# Patient Record
Sex: FEMALE | Race: WHITE | NOT HISPANIC OR LATINO | Employment: PART TIME | URBAN - METROPOLITAN AREA
[De-identification: names, ages, dates, MRNs, and addresses within clinical notes are randomized per-mention and may not be internally consistent; named-entity substitution may affect disease eponyms.]

---

## 2023-04-18 ENCOUNTER — OFFICE VISIT (OUTPATIENT)
Dept: PRIMARY CARE | Facility: CLINIC | Age: 78
End: 2023-04-18
Payer: MEDICARE

## 2023-04-18 VITALS
SYSTOLIC BLOOD PRESSURE: 110 MMHG | OXYGEN SATURATION: 96 % | TEMPERATURE: 97 F | HEART RATE: 65 BPM | DIASTOLIC BLOOD PRESSURE: 70 MMHG | WEIGHT: 131 LBS | BODY MASS INDEX: 21.14 KG/M2

## 2023-04-18 DIAGNOSIS — R63.4 WEIGHT LOSS: Primary | ICD-10-CM

## 2023-04-18 DIAGNOSIS — E78.49 OTHER HYPERLIPIDEMIA: ICD-10-CM

## 2023-04-18 PROBLEM — F41.9 ANXIETY DISORDER: Status: ACTIVE | Noted: 2023-04-18

## 2023-04-18 PROBLEM — G47.00 INSOMNIA: Status: ACTIVE | Noted: 2023-04-18

## 2023-04-18 PROBLEM — E78.00 HYPERCHOLESTEROLEMIA: Status: ACTIVE | Noted: 2023-04-18

## 2023-04-18 PROCEDURE — 1036F TOBACCO NON-USER: CPT | Performed by: FAMILY MEDICINE

## 2023-04-18 PROCEDURE — 1160F RVW MEDS BY RX/DR IN RCRD: CPT | Performed by: FAMILY MEDICINE

## 2023-04-18 PROCEDURE — 1159F MED LIST DOCD IN RCRD: CPT | Performed by: FAMILY MEDICINE

## 2023-04-18 PROCEDURE — 99212 OFFICE O/P EST SF 10 MIN: CPT | Performed by: FAMILY MEDICINE

## 2023-04-18 RX ORDER — CITALOPRAM 20 MG/1
10 TABLET, FILM COATED ORAL DAILY
Qty: 15 TABLET | Refills: 11 | Status: SHIPPED
Start: 2023-04-18 | End: 2024-02-27

## 2023-04-18 RX ORDER — CITALOPRAM 20 MG/1
1 TABLET, FILM COATED ORAL DAILY
COMMUNITY
Start: 2016-08-08 | End: 2023-04-18 | Stop reason: DRUGHIGH

## 2023-04-18 ASSESSMENT — ENCOUNTER SYMPTOMS
LOSS OF SENSATION IN FEET: 0
DEPRESSION: 0
OCCASIONAL FEELINGS OF UNSTEADINESS: 0

## 2023-04-18 NOTE — PROGRESS NOTES
"Subjective   Patient ID: Tawnya Odell is a 77 y.o. female who presents for New Patient Visit (Discuss loosing weight, has been going on a while. ).    HPI    WEIGHT LOSS: she estimates she has lost 10 pounds over the last year or so  No changes in diet or routine  Denies frequent heartburn or diarrhea    Takes stool softener with magnesium for regularity     Denies polyuria, polydipsia, polyphagia    \"Feel spacey\" during the day     Review of Systems  Review of Systems negative except as noted in HPI and Chief complaint.     Objective               Physical Exam  Constitutional:       General: She is not in acute distress.     Appearance: Normal appearance. She is not ill-appearing.   HENT:      Head: Normocephalic and atraumatic.   Neck:      Vascular: No carotid bruit.   Cardiovascular:      Rate and Rhythm: Normal rate and regular rhythm.      Pulses: Normal pulses.      Heart sounds: Normal heart sounds. No murmur heard.     No gallop.   Pulmonary:      Effort: Pulmonary effort is normal.      Breath sounds: Normal breath sounds. No wheezing, rhonchi or rales.   Musculoskeletal:      Cervical back: Normal range of motion and neck supple. No rigidity or tenderness.   Lymphadenopathy:      Cervical: No cervical adenopathy.   Skin:     General: Skin is warm and dry.   Neurological:      Mental Status: She is alert.   Psychiatric:         Mood and Affect: Mood normal.         Behavior: Behavior normal.       /70 (BP Location: Left arm, Patient Position: Sitting, BP Cuff Size: Adult)   Pulse 65   Temp 36.1 °C (97 °F)   Wt 59.4 kg (131 lb)   SpO2 96%   BMI 21.14 kg/m²      Assessment/Plan   Problem List Items Addressed This Visit          Endocrine/Metabolic    Weight loss - Primary    Relevant Orders    CBC (Completed)    Comprehensive Metabolic Panel (Completed)    TSH with reflex to Free T4 if abnormal (Completed)       Other    Other hyperlipidemia    Relevant Orders    Lipid Panel (Completed)        "

## 2023-04-19 ENCOUNTER — LAB (OUTPATIENT)
Dept: LAB | Facility: LAB | Age: 78
End: 2023-04-19
Payer: MEDICARE

## 2023-04-19 DIAGNOSIS — E78.49 OTHER HYPERLIPIDEMIA: ICD-10-CM

## 2023-04-19 DIAGNOSIS — R63.4 WEIGHT LOSS: ICD-10-CM

## 2023-04-19 LAB
ALANINE AMINOTRANSFERASE (SGPT) (U/L) IN SER/PLAS: 18 U/L (ref 7–45)
ALBUMIN (G/DL) IN SER/PLAS: 4.5 G/DL (ref 3.4–5)
ALKALINE PHOSPHATASE (U/L) IN SER/PLAS: 64 U/L (ref 33–136)
ANION GAP IN SER/PLAS: 11 MMOL/L (ref 10–20)
ASPARTATE AMINOTRANSFERASE (SGOT) (U/L) IN SER/PLAS: 17 U/L (ref 9–39)
BILIRUBIN TOTAL (MG/DL) IN SER/PLAS: 0.7 MG/DL (ref 0–1.2)
CALCIUM (MG/DL) IN SER/PLAS: 9.7 MG/DL (ref 8.6–10.6)
CARBON DIOXIDE, TOTAL (MMOL/L) IN SER/PLAS: 32 MMOL/L (ref 21–32)
CHLORIDE (MMOL/L) IN SER/PLAS: 104 MMOL/L (ref 98–107)
CHOLESTEROL (MG/DL) IN SER/PLAS: 234 MG/DL (ref 0–199)
CHOLESTEROL IN HDL (MG/DL) IN SER/PLAS: 91.5 MG/DL
CHOLESTEROL/HDL RATIO: 2.6
CREATININE (MG/DL) IN SER/PLAS: 0.76 MG/DL (ref 0.5–1.05)
ERYTHROCYTE DISTRIBUTION WIDTH (RATIO) BY AUTOMATED COUNT: 12.1 % (ref 11.5–14.5)
ERYTHROCYTE MEAN CORPUSCULAR HEMOGLOBIN CONCENTRATION (G/DL) BY AUTOMATED: 32.9 G/DL (ref 32–36)
ERYTHROCYTE MEAN CORPUSCULAR VOLUME (FL) BY AUTOMATED COUNT: 98 FL (ref 80–100)
ERYTHROCYTES (10*6/UL) IN BLOOD BY AUTOMATED COUNT: 4.34 X10E12/L (ref 4–5.2)
GFR FEMALE: 81 ML/MIN/1.73M2
GLUCOSE (MG/DL) IN SER/PLAS: 83 MG/DL (ref 74–99)
HEMATOCRIT (%) IN BLOOD BY AUTOMATED COUNT: 42.6 % (ref 36–46)
HEMOGLOBIN (G/DL) IN BLOOD: 14 G/DL (ref 12–16)
LDL: 125 MG/DL (ref 0–99)
LEUKOCYTES (10*3/UL) IN BLOOD BY AUTOMATED COUNT: 6.4 X10E9/L (ref 4.4–11.3)
NRBC (PER 100 WBCS) BY AUTOMATED COUNT: 0 /100 WBC (ref 0–0)
PLATELETS (10*3/UL) IN BLOOD AUTOMATED COUNT: 231 X10E9/L (ref 150–450)
POTASSIUM (MMOL/L) IN SER/PLAS: 4.6 MMOL/L (ref 3.5–5.3)
PROTEIN TOTAL: 6.6 G/DL (ref 6.4–8.2)
SODIUM (MMOL/L) IN SER/PLAS: 142 MMOL/L (ref 136–145)
THYROTROPIN (MIU/L) IN SER/PLAS BY DETECTION LIMIT <= 0.05 MIU/L: 2.61 MIU/L (ref 0.44–3.98)
TRIGLYCERIDE (MG/DL) IN SER/PLAS: 87 MG/DL (ref 0–149)
UREA NITROGEN (MG/DL) IN SER/PLAS: 16 MG/DL (ref 6–23)
VLDL: 17 MG/DL (ref 0–40)

## 2023-04-19 PROCEDURE — 80061 LIPID PANEL: CPT

## 2023-04-19 PROCEDURE — 84443 ASSAY THYROID STIM HORMONE: CPT

## 2023-04-19 PROCEDURE — 36415 COLL VENOUS BLD VENIPUNCTURE: CPT

## 2023-04-19 PROCEDURE — 85027 COMPLETE CBC AUTOMATED: CPT

## 2023-04-19 PROCEDURE — 80053 COMPREHEN METABOLIC PANEL: CPT

## 2023-04-23 PROBLEM — E78.49 OTHER HYPERLIPIDEMIA: Status: ACTIVE | Noted: 2023-04-18

## 2023-04-23 PROBLEM — R63.4 WEIGHT LOSS: Status: ACTIVE | Noted: 2023-04-23

## 2023-04-24 NOTE — PATIENT INSTRUCTIONS
Follow-up  for Medicare AWV after fasting labs obtained.  Please have labs drawn at your earliest convenience.  You should fast 12 hours prior to arriving at the lab - during these 12 hours you may have water, black coffee, black tea - nothing with cream or sugar and no solid foods.    Our office will contact you with results after they have been reviewed.  Please allow 48 - 72 hours for most results, some may take longer.  Please keep in mind that results may post immediately to your online portal, even before we have a chance to review them.  Once we have had the opportunity to review we will post comments and have our staff contact you with results and any instructions.  Please call our office if you do not hear from our office in 7 days.

## 2023-05-03 ENCOUNTER — TELEPHONE (OUTPATIENT)
Dept: PRIMARY CARE | Facility: CLINIC | Age: 78
End: 2023-05-03
Payer: MEDICARE

## 2023-05-03 NOTE — TELEPHONE ENCOUNTER
PATIENT L VOICEMAIL TO MAKE A N APPT, I CALLED AND   L/M FOR PATIENT TO CALL BACK AND MAKE AN APPT.

## 2023-05-15 ENCOUNTER — OFFICE VISIT (OUTPATIENT)
Dept: PRIMARY CARE | Facility: CLINIC | Age: 78
End: 2023-05-15
Payer: MEDICARE

## 2023-05-15 VITALS
OXYGEN SATURATION: 97 % | DIASTOLIC BLOOD PRESSURE: 60 MMHG | SYSTOLIC BLOOD PRESSURE: 102 MMHG | BODY MASS INDEX: 20.89 KG/M2 | HEART RATE: 86 BPM | WEIGHT: 130 LBS | HEIGHT: 66 IN

## 2023-05-15 DIAGNOSIS — E78.00 ELEVATED LDL CHOLESTEROL LEVEL: ICD-10-CM

## 2023-05-15 DIAGNOSIS — L65.9 ALOPECIA: Primary | ICD-10-CM

## 2023-05-15 DIAGNOSIS — R63.4 WEIGHT LOSS: ICD-10-CM

## 2023-05-15 PROCEDURE — 1160F RVW MEDS BY RX/DR IN RCRD: CPT | Performed by: FAMILY MEDICINE

## 2023-05-15 PROCEDURE — 1170F FXNL STATUS ASSESSED: CPT | Performed by: FAMILY MEDICINE

## 2023-05-15 PROCEDURE — 99213 OFFICE O/P EST LOW 20 MIN: CPT | Performed by: FAMILY MEDICINE

## 2023-05-15 PROCEDURE — 1159F MED LIST DOCD IN RCRD: CPT | Performed by: FAMILY MEDICINE

## 2023-05-15 PROCEDURE — 1036F TOBACCO NON-USER: CPT | Performed by: FAMILY MEDICINE

## 2023-05-15 ASSESSMENT — PATIENT HEALTH QUESTIONNAIRE - PHQ9
2. FEELING DOWN, DEPRESSED OR HOPELESS: NOT AT ALL
8. MOVING OR SPEAKING SO SLOWLY THAT OTHER PEOPLE COULD HAVE NOTICED. OR THE OPPOSITE, BEING SO FIGETY OR RESTLESS THAT YOU HAVE BEEN MOVING AROUND A LOT MORE THAN USUAL: NOT AT ALL
SUM OF ALL RESPONSES TO PHQ QUESTIONS 1-9: 3
4. FEELING TIRED OR HAVING LITTLE ENERGY: SEVERAL DAYS
10. IF YOU CHECKED OFF ANY PROBLEMS, HOW DIFFICULT HAVE THESE PROBLEMS MADE IT FOR YOU TO DO YOUR WORK, TAKE CARE OF THINGS AT HOME, OR GET ALONG WITH OTHER PEOPLE: SOMEWHAT DIFFICULT
SUM OF ALL RESPONSES TO PHQ9 QUESTIONS 1 AND 2: 0
1. LITTLE INTEREST OR PLEASURE IN DOING THINGS: NOT AT ALL
5. POOR APPETITE OR OVEREATING: NOT AT ALL
7. TROUBLE CONCENTRATING ON THINGS, SUCH AS READING THE NEWSPAPER OR WATCHING TELEVISION: NOT AT ALL
9. THOUGHTS THAT YOU WOULD BE BETTER OFF DEAD, OR OF HURTING YOURSELF: NOT AT ALL
3. TROUBLE FALLING OR STAYING ASLEEP OR SLEEPING TOO MUCH: SEVERAL DAYS
6. FEELING BAD ABOUT YOURSELF - OR THAT YOU ARE A FAILURE OR HAVE LET YOURSELF OR YOUR FAMILY DOWN: SEVERAL DAYS

## 2023-05-15 ASSESSMENT — ACTIVITIES OF DAILY LIVING (ADL)
HEARING - RIGHT EAR: FUNCTIONAL
DRESSING YOURSELF: INDEPENDENT
TOILETING: INDEPENDENT
FEEDING YOURSELF: INDEPENDENT
PATIENT'S MEMORY ADEQUATE TO SAFELY COMPLETE DAILY ACTIVITIES?: YES
WALKS IN HOME: INDEPENDENT
BATHING: INDEPENDENT
JUDGMENT_ADEQUATE_SAFELY_COMPLETE_DAILY_ACTIVITIES: YES
ADEQUATE_TO_COMPLETE_ADL: YES
HEARING - LEFT EAR: FUNCTIONAL
GROOMING: INDEPENDENT

## 2023-05-15 ASSESSMENT — ENCOUNTER SYMPTOMS
LOSS OF SENSATION IN FEET: 0
DEPRESSION: 0
OCCASIONAL FEELINGS OF UNSTEADINESS: 0

## 2023-05-15 ASSESSMENT — ANXIETY QUESTIONNAIRES
IF YOU CHECKED OFF ANY PROBLEMS ON THIS QUESTIONNAIRE, HOW DIFFICULT HAVE THESE PROBLEMS MADE IT FOR YOU TO DO YOUR WORK, TAKE CARE OF THINGS AT HOME, OR GET ALONG WITH OTHER PEOPLE: SOMEWHAT DIFFICULT
5. BEING SO RESTLESS THAT IT IS HARD TO SIT STILL: NOT AT ALL
2. NOT BEING ABLE TO STOP OR CONTROL WORRYING: SEVERAL DAYS
4. TROUBLE RELAXING: SEVERAL DAYS
7. FEELING AFRAID AS IF SOMETHING AWFUL MIGHT HAPPEN: NOT AT ALL
1. FEELING NERVOUS, ANXIOUS, OR ON EDGE: NOT AT ALL
3. WORRYING TOO MUCH ABOUT DIFFERENT THINGS: SEVERAL DAYS
GAD7 TOTAL SCORE: 3
6. BECOMING EASILY ANNOYED OR IRRITABLE: NOT AT ALL

## 2023-05-15 NOTE — ASSESSMENT & PLAN NOTE
Suggest OTC Hair and Nails supplement with B complex and Folate.  Referral to dermatology for evaluation as well.

## 2023-05-15 NOTE — PATIENT INSTRUCTIONS
Trial of Red Yeast Rice for cholesterol    Hair Loss: trial of Folate and B complex  Referral to [facility] for evaluation    Follow-up after July for Medicare AWV.

## 2023-05-15 NOTE — PROGRESS NOTES
Subjective   Patient ID: Tawnya Odell is a 77 y.o. female who presents for Weight Loss and Alopecia.  HPI    HYPERLIPIDEMIA:  Parents with hyperlipidemia  She does not eat red meat or wilson any longer  She was trie don medication in the pst with her former PCP and did not tolerate the myalgias and fatigue    The 10-year ASCVD risk score (Alli BETH, et al., 2019) is: 10.9%    Values used to calculate the score:      Age: 77 years      Sex: Female      Is Non- : No      Diabetic: No      Tobacco smoker: No      Systolic Blood Pressure: 102 mmHg      Is BP treated: No      HDL Cholesterol: 91.5 mg/dL      Total Cholesterol: 234 mg/dL     Still with gradual weight loss     Mild constipation - Magnesium Citrate helps with regularly     HAIR LOSS: seems to be losing more and more even if she runs her hands through her hair  Taking OTC supplements - no new products or hair regimens  No personal history of autoimmune issues  Recent TSH in normal range    Review of Systems    Review of Systems negative except as noted in HPI and Chief complaint.     Objective     Patient Health Questionnaire-9 Score: 3     ELLEN-7 Total Score: 3     Physical Exam  Constitutional:       General: She is not in acute distress.     Appearance: Normal appearance. She is not ill-appearing.   HENT:      Head: Normocephalic and atraumatic.   Neck:      Vascular: No carotid bruit.   Cardiovascular:      Rate and Rhythm: Normal rate and regular rhythm.      Pulses: Normal pulses.      Heart sounds: Normal heart sounds. No murmur heard.     No gallop.   Pulmonary:      Effort: Pulmonary effort is normal.      Breath sounds: Normal breath sounds. No wheezing, rhonchi or rales.   Musculoskeletal:      Cervical back: Normal range of motion and neck supple. No rigidity or tenderness.   Lymphadenopathy:      Cervical: No cervical adenopathy.   Skin:     General: Skin is warm and dry.   Neurological:      Mental Status: She is alert.  "  Psychiatric:         Mood and Affect: Mood normal.         Behavior: Behavior normal.       /60 (BP Location: Right arm, Patient Position: Sitting, BP Cuff Size: Adult)   Pulse 86   Ht 1.676 m (5' 6\")   Wt 59 kg (130 lb)   SpO2 97%   BMI 20.98 kg/m²      Assessment/Plan   Problem List Items Addressed This Visit          Endocrine/Metabolic    Weight loss    Relevant Orders    Referral to [facility]       Other    Alopecia - Primary     Suggest OTC Hair and Nails supplement with B complex and Folate.  Referral to dermatology for evaluation as well.         Relevant Orders    Referral to Dermatology    Elevated LDL cholesterol level     Trial of OTC Red Yeast Rice  Consider trial of Ezetimibe vs. Low dose statin with elevated ASCVD risk.               "

## 2023-05-15 NOTE — ASSESSMENT & PLAN NOTE
Trial of OTC Red Yeast Rice  Consider trial of Ezetimibe vs. Low dose statin with elevated ASCVD risk.

## 2023-05-18 ENCOUNTER — PATIENT OUTREACH (OUTPATIENT)
Dept: CARE COORDINATION | Facility: CLINIC | Age: 78
End: 2023-05-18
Payer: MEDICARE

## 2023-06-12 ENCOUNTER — PATIENT OUTREACH (OUTPATIENT)
Dept: CARE COORDINATION | Facility: CLINIC | Age: 78
End: 2023-06-12
Payer: MEDICARE

## 2023-06-12 NOTE — PROGRESS NOTES
Ht: 66 in  Wt: 130#  BMI: 21     Nutrition Hx: Has never worked with a dietitian in the past; is concerned with unintended weight loss and elevated LDLs.      Has never had a weight problem in the past. Gained weight after having her children (140s). A couple of years (since COVID) ago she was in the upper 140s and gradually has lost weight until she was ~130#. Recalls cutting out added sugar from ice cream and other desserts out of concern for her family hx of diabetes.     IBW: 135#  Labs:   TC: 234  HDL: 91  LDL: 125     Physical Activity:   -Walks (when weather is nice) and occasionally lifts weights for 5 minutes     Usual diet:   -3 meals/day with 1 small snack (typically nuts)  -Breakfast: black tea, 2 slices of toast with a small amount of jelly or honey (no butter)  -Protein drink (20g) 1-2 hours later (started doing this to help gain muscle) 2 hours after breakfast with vitamins (red yeast rice, turmeric).  -Lunch: chicken or tuna salad, sometimes pb&j, 1 cup coffee.  -Dinner: doesn't prepare a lot of red meat; will cook for she and her . Makes turkey burgers, grilled chicken, salmon once per week. Adds a starch and veggie to meals. Decaf tea.  -Fluids: tea, coffee, water  -When eating out, she will order seafood or another lean choice (ex: trout); goes to [employer] or a burger restaurant.     -Rec'd 2 servings of fatty fish/week, adding back eggs to diet. Including 2 cups of dairy/day. Adding a balanced snack between lunch and dinner (1 protein, 1 fruit or veg or dairy). Eating a rainbow of colors. Eat meal or snack every 3-4 hours. Walk with intention every other day for 15-20 minutes.  -Protein recs: 55g/day (.09g/kg)  SEND: balance snack resources, physical activity recs

## 2023-07-31 ENCOUNTER — OFFICE VISIT (OUTPATIENT)
Dept: PRIMARY CARE | Facility: CLINIC | Age: 78
End: 2023-07-31
Payer: MEDICARE

## 2023-07-31 VITALS
SYSTOLIC BLOOD PRESSURE: 110 MMHG | HEART RATE: 68 BPM | OXYGEN SATURATION: 98 % | BODY MASS INDEX: 20.73 KG/M2 | DIASTOLIC BLOOD PRESSURE: 70 MMHG | HEIGHT: 66 IN | WEIGHT: 129 LBS

## 2023-07-31 DIAGNOSIS — S16.1XXA STRAIN OF NECK MUSCLE, INITIAL ENCOUNTER: ICD-10-CM

## 2023-07-31 DIAGNOSIS — H10.13 ALLERGIC CONJUNCTIVITIS OF BOTH EYES: Primary | ICD-10-CM

## 2023-07-31 PROBLEM — G47.00 INSOMNIA: Status: RESOLVED | Noted: 2023-04-18 | Resolved: 2023-07-31

## 2023-07-31 PROCEDURE — 1160F RVW MEDS BY RX/DR IN RCRD: CPT | Performed by: FAMILY MEDICINE

## 2023-07-31 PROCEDURE — 1036F TOBACCO NON-USER: CPT | Performed by: FAMILY MEDICINE

## 2023-07-31 PROCEDURE — 1159F MED LIST DOCD IN RCRD: CPT | Performed by: FAMILY MEDICINE

## 2023-07-31 PROCEDURE — 99214 OFFICE O/P EST MOD 30 MIN: CPT | Performed by: FAMILY MEDICINE

## 2023-07-31 RX ORDER — PREDNISONE 20 MG/1
TABLET ORAL
Qty: 9 TABLET | Refills: 0 | Status: SHIPPED | OUTPATIENT
Start: 2023-07-31 | End: 2023-08-06

## 2023-07-31 RX ORDER — OLOPATADINE HYDROCHLORIDE 1 MG/ML
1 SOLUTION/ DROPS OPHTHALMIC 2 TIMES DAILY
Qty: 3 ML | Refills: 2 | Status: SHIPPED | OUTPATIENT
Start: 2023-07-31 | End: 2024-07-30

## 2023-07-31 NOTE — PROGRESS NOTES
"Subjective   Patient ID: Tawnya Odell is a 78 y.o. female who presents for Jaw Pain (Ear pain, glands in her neck swollen and painful.) and Dry Eye (Itching and irritated. ).  HPI    Started 6 weeks or so ago  Thought it was her TMJ  Swelling along left neck and tenderness  Initially pain with chewing but now better    No recent illness  No fever or chills     Review of Systems    Review of Systems negative except as noted in HPI and Chief complaint.     Objective               Physical Exam  Constitutional:       Appearance: She is normal weight.   HENT:      Head: Normocephalic and atraumatic.      Right Ear: Tympanic membrane normal.      Left Ear: Tympanic membrane normal.      Nose: Nose normal. No congestion or rhinorrhea.      Mouth/Throat:      Mouth: Mucous membranes are moist.   Cardiovascular:      Rate and Rhythm: Normal rate and regular rhythm.      Heart sounds: No murmur heard.  Musculoskeletal:      Cervical back: Normal range of motion. Tenderness (tenderness to palpation of sternocleidomastoid muscles) present.   Neurological:      Mental Status: She is alert.       /70 (BP Location: Left arm, Patient Position: Sitting, BP Cuff Size: Adult)   Pulse 68   Ht 1.676 m (5' 6\")   Wt 58.5 kg (129 lb)   SpO2 98%   BMI 20.82 kg/m²      Assessment/Plan   Problem List Items Addressed This Visit    None  Visit Diagnoses       Allergic conjunctivitis of both eyes    -  Primary    Relevant Medications    olopatadine (Patanol) 0.1 % ophthalmic solution    Strain of neck muscle, initial encounter        Relevant Medications    predniSONE (Deltasone) 20 mg tablet          Call if symptoms worsen or persist.     "

## 2023-08-07 ENCOUNTER — PATIENT OUTREACH (OUTPATIENT)
Dept: CARE COORDINATION | Facility: CLINIC | Age: 78
End: 2023-08-07
Payer: MEDICARE

## 2023-08-07 NOTE — PROGRESS NOTES
Pt emailed letting me know she feels she has the tools to be successful and would like to discontinue sessions at this time.

## 2023-09-26 ENCOUNTER — DOCUMENTATION (OUTPATIENT)
Dept: CARE COORDINATION | Facility: CLINIC | Age: 78
End: 2023-09-26
Payer: MEDICARE

## 2023-11-21 ENCOUNTER — APPOINTMENT (OUTPATIENT)
Dept: RADIOLOGY | Facility: CLINIC | Age: 78
End: 2023-11-21
Payer: MEDICARE

## 2023-11-21 DIAGNOSIS — Z12.31 SCREENING MAMMOGRAM FOR BREAST CANCER: ICD-10-CM

## 2023-12-18 ENCOUNTER — APPOINTMENT (OUTPATIENT)
Dept: RADIOLOGY | Facility: CLINIC | Age: 78
End: 2023-12-18
Payer: MEDICARE

## 2024-01-16 ENCOUNTER — ANCILLARY PROCEDURE (OUTPATIENT)
Dept: RADIOLOGY | Facility: CLINIC | Age: 79
End: 2024-01-16
Payer: MEDICARE

## 2024-01-16 ENCOUNTER — APPOINTMENT (OUTPATIENT)
Dept: RADIOLOGY | Facility: CLINIC | Age: 79
End: 2024-01-16
Payer: MEDICARE

## 2024-01-16 VITALS — HEIGHT: 66 IN | WEIGHT: 129.63 LBS | BODY MASS INDEX: 20.83 KG/M2

## 2024-01-16 DIAGNOSIS — Z12.31 SCREENING MAMMOGRAM FOR BREAST CANCER: ICD-10-CM

## 2024-01-16 PROCEDURE — 77063 BREAST TOMOSYNTHESIS BI: CPT | Performed by: RADIOLOGY

## 2024-01-16 PROCEDURE — 77067 SCR MAMMO BI INCL CAD: CPT

## 2024-01-16 PROCEDURE — 77067 SCR MAMMO BI INCL CAD: CPT | Performed by: RADIOLOGY

## 2024-01-18 DIAGNOSIS — R92.8 ABNORMAL MAMMOGRAM OF LEFT BREAST: Primary | ICD-10-CM

## 2024-01-19 ENCOUNTER — TELEPHONE (OUTPATIENT)
Dept: PRIMARY CARE | Facility: CLINIC | Age: 79
End: 2024-01-19
Payer: MEDICARE

## 2024-01-22 ENCOUNTER — PATIENT MESSAGE (OUTPATIENT)
Dept: PRIMARY CARE | Facility: CLINIC | Age: 79
End: 2024-01-22
Payer: MEDICARE

## 2024-01-23 ENCOUNTER — HOSPITAL ENCOUNTER (OUTPATIENT)
Dept: RADIOLOGY | Facility: CLINIC | Age: 79
Discharge: HOME | End: 2024-01-23
Payer: MEDICARE

## 2024-01-23 DIAGNOSIS — R92.8 ABNORMAL MAMMOGRAM OF LEFT BREAST: ICD-10-CM

## 2024-01-23 PROCEDURE — 76642 ULTRASOUND BREAST LIMITED: CPT | Mod: LEFT SIDE | Performed by: STUDENT IN AN ORGANIZED HEALTH CARE EDUCATION/TRAINING PROGRAM

## 2024-01-23 PROCEDURE — 76642 ULTRASOUND BREAST LIMITED: CPT | Mod: LT

## 2024-01-23 PROCEDURE — 76981 USE PARENCHYMA: CPT | Mod: LT

## 2024-01-23 PROCEDURE — 77061 BREAST TOMOSYNTHESIS UNI: CPT | Mod: LT

## 2024-01-23 PROCEDURE — G0279 TOMOSYNTHESIS, MAMMO: HCPCS | Mod: LEFT SIDE | Performed by: STUDENT IN AN ORGANIZED HEALTH CARE EDUCATION/TRAINING PROGRAM

## 2024-01-23 PROCEDURE — 77065 DX MAMMO INCL CAD UNI: CPT | Mod: LEFT SIDE | Performed by: STUDENT IN AN ORGANIZED HEALTH CARE EDUCATION/TRAINING PROGRAM

## 2024-02-27 ENCOUNTER — OFFICE VISIT (OUTPATIENT)
Dept: PRIMARY CARE | Facility: CLINIC | Age: 79
End: 2024-02-27
Payer: MEDICARE

## 2024-02-27 VITALS
OXYGEN SATURATION: 98 % | WEIGHT: 130.8 LBS | DIASTOLIC BLOOD PRESSURE: 60 MMHG | SYSTOLIC BLOOD PRESSURE: 110 MMHG | HEIGHT: 65 IN | HEART RATE: 68 BPM | BODY MASS INDEX: 21.79 KG/M2

## 2024-02-27 DIAGNOSIS — N81.10 BLADDER PROLAPSE, FEMALE, ACQUIRED: ICD-10-CM

## 2024-02-27 DIAGNOSIS — E78.49 OTHER HYPERLIPIDEMIA: ICD-10-CM

## 2024-02-27 DIAGNOSIS — Z00.00 MEDICARE ANNUAL WELLNESS VISIT, SUBSEQUENT: Primary | ICD-10-CM

## 2024-02-27 DIAGNOSIS — E55.9 VITAMIN D DEFICIENCY: ICD-10-CM

## 2024-02-27 DIAGNOSIS — Z78.0 ASYMPTOMATIC MENOPAUSAL STATE: ICD-10-CM

## 2024-02-27 DIAGNOSIS — Z11.59 NEED FOR HEPATITIS C SCREENING TEST: ICD-10-CM

## 2024-02-27 DIAGNOSIS — R63.4 WEIGHT LOSS: ICD-10-CM

## 2024-02-27 PROCEDURE — 1123F ACP DISCUSS/DSCN MKR DOCD: CPT | Performed by: FAMILY MEDICINE

## 2024-02-27 PROCEDURE — 1036F TOBACCO NON-USER: CPT | Performed by: FAMILY MEDICINE

## 2024-02-27 PROCEDURE — G0439 PPPS, SUBSEQ VISIT: HCPCS | Performed by: FAMILY MEDICINE

## 2024-02-27 PROCEDURE — 1160F RVW MEDS BY RX/DR IN RCRD: CPT | Performed by: FAMILY MEDICINE

## 2024-02-27 PROCEDURE — 1159F MED LIST DOCD IN RCRD: CPT | Performed by: FAMILY MEDICINE

## 2024-02-27 PROCEDURE — 1170F FXNL STATUS ASSESSED: CPT | Performed by: FAMILY MEDICINE

## 2024-02-27 ASSESSMENT — ENCOUNTER SYMPTOMS
LOSS OF SENSATION IN FEET: 0
OCCASIONAL FEELINGS OF UNSTEADINESS: 0
DEPRESSION: 0

## 2024-02-27 ASSESSMENT — PATIENT HEALTH QUESTIONNAIRE - PHQ9
4. FEELING TIRED OR HAVING LITTLE ENERGY: SEVERAL DAYS
3. TROUBLE FALLING OR STAYING ASLEEP OR SLEEPING TOO MUCH: SEVERAL DAYS
SUM OF ALL RESPONSES TO PHQ QUESTIONS 1-9: 2
SUM OF ALL RESPONSES TO PHQ9 QUESTIONS 1 AND 2: 0
9. THOUGHTS THAT YOU WOULD BE BETTER OFF DEAD, OR OF HURTING YOURSELF: NOT AT ALL
8. MOVING OR SPEAKING SO SLOWLY THAT OTHER PEOPLE COULD HAVE NOTICED. OR THE OPPOSITE, BEING SO FIGETY OR RESTLESS THAT YOU HAVE BEEN MOVING AROUND A LOT MORE THAN USUAL: NOT AT ALL
6. FEELING BAD ABOUT YOURSELF - OR THAT YOU ARE A FAILURE OR HAVE LET YOURSELF OR YOUR FAMILY DOWN: NOT AT ALL
5. POOR APPETITE OR OVEREATING: NOT AT ALL
1. LITTLE INTEREST OR PLEASURE IN DOING THINGS: NOT AT ALL
7. TROUBLE CONCENTRATING ON THINGS, SUCH AS READING THE NEWSPAPER OR WATCHING TELEVISION: NOT AT ALL
10. IF YOU CHECKED OFF ANY PROBLEMS, HOW DIFFICULT HAVE THESE PROBLEMS MADE IT FOR YOU TO DO YOUR WORK, TAKE CARE OF THINGS AT HOME, OR GET ALONG WITH OTHER PEOPLE: NOT DIFFICULT AT ALL
2. FEELING DOWN, DEPRESSED OR HOPELESS: NOT AT ALL

## 2024-02-27 ASSESSMENT — LIFESTYLE VARIABLES
HOW OFTEN DO YOU HAVE A DRINK CONTAINING ALCOHOL: NEVER
HOW OFTEN DO YOU HAVE SIX OR MORE DRINKS ON ONE OCCASION: NEVER
HOW MANY STANDARD DRINKS CONTAINING ALCOHOL DO YOU HAVE ON A TYPICAL DAY: 1 OR 2
HOW MANY STANDARD DRINKS CONTAINING ALCOHOL DO YOU HAVE ON A TYPICAL DAY: PATIENT DOES NOT DRINK
HOW OFTEN DO YOU HAVE A DRINK CONTAINING ALCOHOL: MONTHLY OR LESS
AUDIT-C TOTAL SCORE: 1
SKIP TO QUESTIONS 9-10: 1
SKIP TO QUESTIONS 9-10: 1
HOW OFTEN DO YOU HAVE SIX OR MORE DRINKS ON ONE OCCASION: NEVER
AUDIT-C TOTAL SCORE: 0

## 2024-02-27 ASSESSMENT — ACTIVITIES OF DAILY LIVING (ADL)
GROCERY_SHOPPING: INDEPENDENT
MANAGING_FINANCES: INDEPENDENT
DOING_HOUSEWORK: INDEPENDENT
DRESSING: INDEPENDENT
BATHING: INDEPENDENT
TAKING_MEDICATION: INDEPENDENT

## 2024-02-27 NOTE — PROGRESS NOTES
Subjective   Reason for Visit: Tawnya Odell is an 78 y.o. female here for a Medicare Wellness visit.     Past Medical, Surgical, and Family History reviewed and updated in chart.    Reviewed all medications by prescribing practitioner or clinical pharmacist (such as prescriptions, OTCs, herbal therapies and supplements) and documented in the medical record.    HPI    WEIGHT LOSS: difficulty maintaining weight  Trying to eat more protein and started some protein shakes  Toast with sausages for breakfast, sandwich at lunch and more balanced dinner  Met with the dietician who suggested adding cottage cheese    RIGHT UPPER QUADRANT PAIN: worse with walking, sometimes at night  No changes in bowel pattern  Had Us of GB a few year ago and was normal  Describes as achy and occurs a few times per week      SOC Hx:     Tobacco Use: Low Risk  (3/3/2024)    Patient History     Smoking Tobacco Use: Never     Smokeless Tobacco Use: Never     Passive Exposure: Not on file     Alcohol Use: Not At Risk (3/3/2024)    AUDIT-C     Frequency of Alcohol Consumption: Never     Average Number of Drinks: Patient does not drink     Frequency of Binge Drinking: Never       DIET: general increased proteins  Tumeric, Vit B 12, Vit C    EXERCISE:  walking weather permitting 1-2 miles daily  line dancing and Pilates    ELIMINATION: no constipation or diarrhea  COLON CA SCREENING: n/a    Urinary issues prolapse has bladder prolapse    SLEEP:  disturbed tried magnesium sometimes it helps other nights it does not help.    MOODS:  no concerning symptoms      PHQ-9: 2        OB/GYN: LMP: No LMP recorded.  Menstrual cycles - menopausal  MAMMO:  n/a  Last pap date: n/a    Patient Care Team:  Jeanna Suazo DO as PCP - General (Family Medicine)  Jeanna Suazo DO as PCP - [facility]ributed Provider     Review of Systems    Objective   Vitals:  /60 (BP Location: Left arm, Patient Position: Sitting, BP Cuff Size: Adult)   Pulse 68   Ht 1.651 m  "(5' 5\")   Wt 59.3 kg (130 lb 12.8 oz)   SpO2 98%   BMI 21.77 kg/m²       Physical Exam  Constitutional:       General: She is not in acute distress.     Appearance: Normal appearance.   HENT:      Head: Normocephalic and atraumatic.      Right Ear: Tympanic membrane, ear canal and external ear normal.      Left Ear: Tympanic membrane, ear canal and external ear normal.      Nose: Nose normal. No congestion or rhinorrhea.      Mouth/Throat:      Mouth: Mucous membranes are moist.      Pharynx: No oropharyngeal exudate or posterior oropharyngeal erythema.   Eyes:      Extraocular Movements: Extraocular movements intact.      Conjunctiva/sclera: Conjunctivae normal.      Pupils: Pupils are equal, round, and reactive to light.   Neck:      Vascular: No carotid bruit.   Cardiovascular:      Rate and Rhythm: Normal rate and regular rhythm.      Heart sounds: No murmur heard.  Pulmonary:      Effort: Pulmonary effort is normal.      Breath sounds: Normal breath sounds. No wheezing or rhonchi.   Abdominal:      General: Bowel sounds are normal.      Palpations: Abdomen is soft.   Musculoskeletal:         General: No swelling.      Cervical back: No rigidity.      Right lower leg: No edema.      Left lower leg: No edema.   Lymphadenopathy:      Cervical: No cervical adenopathy.   Skin:     General: Skin is warm and dry.      Findings: No rash.   Neurological:      General: No focal deficit present.      Mental Status: She is alert and oriented to person, place, and time.      Cranial Nerves: No cranial nerve deficit.      Gait: Gait normal.      Deep Tendon Reflexes: Reflexes normal.   Psychiatric:         Mood and Affect: Mood normal.         Behavior: Behavior normal.         Assessment/Plan   Problem List Items Addressed This Visit       Other hyperlipidemia    Relevant Orders    Lipid Panel    CT cardiac scoring wo IV contrast    Weight loss    Relevant Orders    Comprehensive Metabolic Panel    TSH with reflex to " Free T4 if abnormal     Other Visit Diagnoses       Medicare annual wellness visit, subsequent    -  Primary    Asymptomatic menopausal state        Relevant Orders    XR DEXA bone density    Need for hepatitis C screening test        Relevant Orders    Hepatitis C antibody    Vitamin D deficiency        Relevant Orders    Vitamin D 25-Hydroxy,Total (for eval of Vitamin D levels)    Bladder prolapse, female, acquired        Relevant Orders    Referral to Urology          The 10-year ASCVD risk score (Alli BETH, et al., 2019) is: 14.1%    Values used to calculate the score:      Age: 78 years      Sex: Female      Is Non- : No      Diabetic: No      Tobacco smoker: No      Systolic Blood Pressure: 110 mmHg      Is BP treated: No      HDL Cholesterol: 91.5 mg/dL      Total Cholesterol: 234 mg/dL     Medicare Wellness Billing Compliance Satisfied    *This is a visual tool to show completion of required items on the day of the visit. Green checks will only appear on the date of visit.    Review all medications by prescribing practitioner or clinical pharmacist (such as prescriptions, OTCs, herbal therapies and supplements) documented in the medical record    Past Medical, Surgical, and Family History reviewed and updated in chart    Tobacco Use Reviewed    Alcohol Use Reviewed    Illicit Drug Use Reviewed    PHQ2/9    Falls in Last Year Reviewed    Home Safety Risk Factors Reviewed    Cognitive Impairment Reviewed    Patient Self Assessment and Health Status    Current Diet Reviewed    Exercise Frequency    ADL - Hearing Impairment    ADL - Bathing    ADL - Dressing    ADL - Walks in Home    IADL - Managing Finances    IADL - Grocery Shopping    IADL - Taking Medications    IADL - Doing Housework

## 2024-03-03 ASSESSMENT — ACTIVITIES OF DAILY LIVING (ADL)
MANAGING_FINANCES: INDEPENDENT
TAKING_MEDICATION: INDEPENDENT
BATHING: INDEPENDENT
DRESSING: INDEPENDENT
GROCERY_SHOPPING: INDEPENDENT
DOING_HOUSEWORK: INDEPENDENT

## 2024-03-03 ASSESSMENT — LIFESTYLE VARIABLES
AUDIT-C TOTAL SCORE: 0
HOW OFTEN DO YOU HAVE A DRINK CONTAINING ALCOHOL: NEVER
HOW MANY STANDARD DRINKS CONTAINING ALCOHOL DO YOU HAVE ON A TYPICAL DAY: PATIENT DOES NOT DRINK
HOW OFTEN DO YOU HAVE SIX OR MORE DRINKS ON ONE OCCASION: NEVER
SKIP TO QUESTIONS 9-10: 1

## 2024-03-03 ASSESSMENT — PATIENT HEALTH QUESTIONNAIRE - PHQ9
SUM OF ALL RESPONSES TO PHQ9 QUESTIONS 1 AND 2: 0
1. LITTLE INTEREST OR PLEASURE IN DOING THINGS: NOT AT ALL
2. FEELING DOWN, DEPRESSED OR HOPELESS: NOT AT ALL

## 2024-03-08 ENCOUNTER — APPOINTMENT (OUTPATIENT)
Dept: RADIOLOGY | Facility: CLINIC | Age: 79
End: 2024-03-08
Payer: MEDICARE

## 2024-03-08 ENCOUNTER — LAB (OUTPATIENT)
Dept: LAB | Facility: LAB | Age: 79
End: 2024-03-08
Payer: MEDICARE

## 2024-03-08 DIAGNOSIS — E55.9 VITAMIN D DEFICIENCY: ICD-10-CM

## 2024-03-08 DIAGNOSIS — E78.49 OTHER HYPERLIPIDEMIA: ICD-10-CM

## 2024-03-08 DIAGNOSIS — R63.4 WEIGHT LOSS: ICD-10-CM

## 2024-03-08 DIAGNOSIS — Z11.59 NEED FOR HEPATITIS C SCREENING TEST: ICD-10-CM

## 2024-03-08 LAB
25(OH)D3 SERPL-MCNC: 38 NG/ML (ref 30–100)
ALBUMIN SERPL BCP-MCNC: 4.2 G/DL (ref 3.4–5)
ALP SERPL-CCNC: 71 U/L (ref 33–136)
ALT SERPL W P-5'-P-CCNC: 18 U/L (ref 7–45)
ANION GAP SERPL CALC-SCNC: 9 MMOL/L (ref 10–20)
AST SERPL W P-5'-P-CCNC: 18 U/L (ref 9–39)
BILIRUB SERPL-MCNC: 0.6 MG/DL (ref 0–1.2)
BUN SERPL-MCNC: 17 MG/DL (ref 6–23)
CALCIUM SERPL-MCNC: 9.6 MG/DL (ref 8.6–10.6)
CHLORIDE SERPL-SCNC: 105 MMOL/L (ref 98–107)
CHOLEST SERPL-MCNC: 212 MG/DL (ref 0–199)
CHOLESTEROL/HDL RATIO: 2.5
CO2 SERPL-SCNC: 32 MMOL/L (ref 21–32)
CREAT SERPL-MCNC: 0.7 MG/DL (ref 0.5–1.05)
EGFRCR SERPLBLD CKD-EPI 2021: 90 ML/MIN/1.73M*2
GLUCOSE SERPL-MCNC: 93 MG/DL (ref 74–99)
HCV AB SER QL: NONREACTIVE
HDLC SERPL-MCNC: 86.4 MG/DL
LDLC SERPL CALC-MCNC: 107 MG/DL
NON HDL CHOLESTEROL: 126 MG/DL (ref 0–149)
POTASSIUM SERPL-SCNC: 4.3 MMOL/L (ref 3.5–5.3)
PROT SERPL-MCNC: 6.6 G/DL (ref 6.4–8.2)
SODIUM SERPL-SCNC: 142 MMOL/L (ref 136–145)
TRIGL SERPL-MCNC: 93 MG/DL (ref 0–149)
TSH SERPL-ACNC: 2.19 MIU/L (ref 0.44–3.98)
VLDL: 19 MG/DL (ref 0–40)

## 2024-03-08 PROCEDURE — 84443 ASSAY THYROID STIM HORMONE: CPT

## 2024-03-08 PROCEDURE — 80053 COMPREHEN METABOLIC PANEL: CPT

## 2024-03-08 PROCEDURE — 86803 HEPATITIS C AB TEST: CPT

## 2024-03-08 PROCEDURE — 36415 COLL VENOUS BLD VENIPUNCTURE: CPT

## 2024-03-08 PROCEDURE — 82306 VITAMIN D 25 HYDROXY: CPT

## 2024-03-08 PROCEDURE — 80061 LIPID PANEL: CPT

## 2024-03-28 ENCOUNTER — TELEPHONE (OUTPATIENT)
Dept: PRIMARY CARE | Facility: CLINIC | Age: 79
End: 2024-03-28
Payer: MEDICARE

## 2024-03-28 DIAGNOSIS — Z12.11 ENCOUNTER FOR SCREENING FOR MALIGNANT NEOPLASM OF COLON: Primary | ICD-10-CM

## 2024-04-01 DIAGNOSIS — Z12.11 COLON CANCER SCREENING: ICD-10-CM

## 2024-04-01 RX ORDER — POLYETHYLENE GLYCOL 3350, SODIUM SULFATE ANHYDROUS, SODIUM BICARBONATE, SODIUM CHLORIDE, POTASSIUM CHLORIDE 236; 22.74; 6.74; 5.86; 2.97 G/4L; G/4L; G/4L; G/4L; G/4L
4000 POWDER, FOR SOLUTION ORAL ONCE
Qty: 4000 ML | Refills: 0 | Status: SHIPPED | OUTPATIENT
Start: 2024-04-01 | End: 2024-04-01

## 2024-04-05 ENCOUNTER — OFFICE VISIT (OUTPATIENT)
Dept: UROLOGY | Facility: HOSPITAL | Age: 79
End: 2024-04-05
Payer: MEDICARE

## 2024-04-05 DIAGNOSIS — N81.10 BLADDER PROLAPSE, FEMALE, ACQUIRED: Primary | ICD-10-CM

## 2024-04-05 DIAGNOSIS — R31.21 ASYMPTOMATIC MICROSCOPIC HEMATURIA: ICD-10-CM

## 2024-04-05 LAB
POC APPEARANCE, URINE: CLEAR
POC BILIRUBIN, URINE: NEGATIVE
POC BLOOD, URINE: ABNORMAL
POC COLOR, URINE: YELLOW
POC GLUCOSE, URINE: NEGATIVE MG/DL
POC KETONES, URINE: NEGATIVE MG/DL
POC LEUKOCYTES, URINE: ABNORMAL
POC NITRITE,URINE: NEGATIVE
POC PH, URINE: 6 PH
POC PROTEIN, URINE: NEGATIVE MG/DL
POC SPECIFIC GRAVITY, URINE: 1.02
POC UROBILINOGEN, URINE: 0.2 EU/DL

## 2024-04-05 PROCEDURE — 81001 URINALYSIS AUTO W/SCOPE: CPT | Mod: AHULAB | Performed by: NURSE PRACTITIONER

## 2024-04-05 PROCEDURE — 1123F ACP DISCUSS/DSCN MKR DOCD: CPT | Performed by: NURSE PRACTITIONER

## 2024-04-05 PROCEDURE — 1160F RVW MEDS BY RX/DR IN RCRD: CPT | Performed by: NURSE PRACTITIONER

## 2024-04-05 PROCEDURE — 81003 URINALYSIS AUTO W/O SCOPE: CPT | Mod: 91 | Performed by: NURSE PRACTITIONER

## 2024-04-05 PROCEDURE — 99203 OFFICE O/P NEW LOW 30 MIN: CPT | Performed by: NURSE PRACTITIONER

## 2024-04-05 PROCEDURE — 87086 URINE CULTURE/COLONY COUNT: CPT | Performed by: NURSE PRACTITIONER

## 2024-04-05 PROCEDURE — 1159F MED LIST DOCD IN RCRD: CPT | Performed by: NURSE PRACTITIONER

## 2024-04-05 PROCEDURE — 99213 OFFICE O/P EST LOW 20 MIN: CPT | Performed by: NURSE PRACTITIONER

## 2024-04-05 NOTE — PROGRESS NOTES
"  [facility]  Outpatient Clinic Note    Subjective   Tawnya Odell is a 78 y.o. female NPV     History of Present Illness   Patient presenting to clinic today NPV with complaint of increased urinary urgency, frequency. She reports a history of bladder prolapse, HLD, and Anxiety. She is interested in a pessary or other treatment.  No recent UTI's. She denies gross hematuria, dysuria, fevers, chills, or flank pain.   Urinalysis today: Trace blood, Small Leukocytes.     No results found for: \"URINECULTURE\"    Past Medical History and Surgical History   Past Medical History:   Diagnosis Date    Personal history of other specified conditions     History of snoring    Unspecified cataract     Cataracts, bilateral     Past Surgical History:   Procedure Laterality Date    APPENDECTOMY  2015    Appendectomy    DILATION AND CURETTAGE OF UTERUS  2015    Dilation And Curettage    MOUTH SURGERY  2018    Oral Surgery Tooth Extraction    OTHER SURGICAL HISTORY  2015    bilat breast reduction    TONSILLECTOMY  2018    Tonsillectomy       Medications  Current Outpatient Medications on File Prior to Visit   Medication Sig Dispense Refill    olopatadine (Patanol) 0.1 % ophthalmic solution Administer 1 drop into both eyes 2 times a day. 3 mL 2    [] polyethylene glycol-electrolytes (Golytely) 420 gram solution Take 4,000 mL by mouth 1 time for 1 dose. Start drinking 1/2 starting at 6pm the night before.  Drink the 2nd 1/2 5 hours before procedure time 4000 mL 0     No current facility-administered medications on file prior to visit.       Objective   Physicial Exam  General: Well developed, well nourished, alert and cooperative, appears in no acute distress  Eyes: Non-injected conjunctiva, sclera clear, no proptosis  Cardiac: Extremities are warm and well perfused. No edema, cyanosis or pallor.   Lungs: Breathing is easy, non-labored. Speaking in clear and complete sentences. Normal " diaphragmatic movement.  MSK: Ambulatory with steady gait, unassisted  Neuro: alert and oriented to person, place and time  Psych: Demonstrates good judgement and reason, without hallucinations, abnormal affect or abnormal behaviors.  Skin: no obvious lesions, no rashes.    Review of Systems  All other systems have been reviewed and are negative for complaint.    Assessment and Plan   Patient presenting to clinic today NPV with complaint of increased urinary urgency, frequency. She reports a history of bladder prolapse, HLD, and Anxiety. She is interested in a pessary or other treatment.  No recent UTI's. She denies gross hematuria, dysuria, fevers, chills, or flank pain.     Sending Urine for micro and Culture. I will call with results. Patient will be scheduled with Estefania Pacheco CNP (prefers female) for further evaluation and treatment.     All questions and concerns were addressed. Patient verbalizes understanding and has no other questions at this time.     You are able to have email access to your chart. You can sign into [employer] or add the [employer] uli on your smart phone to review today's visit and labs.     If you have any questions about your care, do not hesitate to call and leave a message, we return calls in a timely manner.    Tiesha Hood-- AIDA CAMPO  Office Phone:  [phone]

## 2024-04-06 LAB
APPEARANCE UR: CLEAR
BACTERIA #/AREA URNS AUTO: ABNORMAL /HPF
BILIRUB UR STRIP.AUTO-MCNC: NEGATIVE MG/DL
COLOR UR: ABNORMAL
GLUCOSE UR STRIP.AUTO-MCNC: NORMAL MG/DL
KETONES UR STRIP.AUTO-MCNC: NEGATIVE MG/DL
LEUKOCYTE ESTERASE UR QL STRIP.AUTO: ABNORMAL
MUCOUS THREADS #/AREA URNS AUTO: ABNORMAL /LPF
NITRITE UR QL STRIP.AUTO: NEGATIVE
PH UR STRIP.AUTO: 6 [PH]
PROT UR STRIP.AUTO-MCNC: NEGATIVE MG/DL
RBC # UR STRIP.AUTO: NEGATIVE /UL
RBC #/AREA URNS AUTO: ABNORMAL /HPF
SP GR UR STRIP.AUTO: 1.02
SQUAMOUS #/AREA URNS AUTO: ABNORMAL /HPF
UROBILINOGEN UR STRIP.AUTO-MCNC: NORMAL MG/DL
WBC #/AREA URNS AUTO: ABNORMAL /HPF

## 2024-04-07 LAB — BACTERIA UR CULT: NORMAL

## 2024-04-15 ENCOUNTER — HOSPITAL ENCOUNTER (OUTPATIENT)
Dept: RADIOLOGY | Facility: CLINIC | Age: 79
Discharge: HOME | End: 2024-04-15
Payer: MEDICARE

## 2024-04-15 DIAGNOSIS — E78.49 OTHER HYPERLIPIDEMIA: ICD-10-CM

## 2024-04-15 PROCEDURE — 75571 CT HRT W/O DYE W/CA TEST: CPT

## 2024-05-13 ENCOUNTER — OFFICE VISIT (OUTPATIENT)
Dept: UROLOGY | Facility: CLINIC | Age: 79
End: 2024-05-13
Payer: MEDICARE

## 2024-05-13 VITALS
HEART RATE: 72 BPM | WEIGHT: 127 LBS | SYSTOLIC BLOOD PRESSURE: 137 MMHG | HEIGHT: 66 IN | BODY MASS INDEX: 20.41 KG/M2 | DIASTOLIC BLOOD PRESSURE: 81 MMHG

## 2024-05-13 DIAGNOSIS — N81.10 BLADDER PROLAPSE, FEMALE, ACQUIRED: Primary | ICD-10-CM

## 2024-05-13 DIAGNOSIS — N95.8 GENITOURINARY SYNDROME OF MENOPAUSE: ICD-10-CM

## 2024-05-13 DIAGNOSIS — R39.198 OTHER DIFFICULTIES WITH MICTURITION: ICD-10-CM

## 2024-05-13 DIAGNOSIS — N39.41 URGENCY INCONTINENCE: ICD-10-CM

## 2024-05-13 DIAGNOSIS — F52.9 FEMALE SEXUAL DYSFUNCTION: ICD-10-CM

## 2024-05-13 DIAGNOSIS — R31.29 MICROHEMATURIA: ICD-10-CM

## 2024-05-13 LAB
POC APPEARANCE, URINE: CLEAR
POC BILIRUBIN, URINE: NEGATIVE
POC BLOOD, URINE: NEGATIVE
POC COLOR, URINE: YELLOW
POC GLUCOSE, URINE: NEGATIVE MG/DL
POC KETONES, URINE: NEGATIVE MG/DL
POC LEUKOCYTES, URINE: NEGATIVE
POC NITRITE,URINE: NEGATIVE
POC PH, URINE: 5.5 PH
POC PROTEIN, URINE: NEGATIVE MG/DL
POC SPECIFIC GRAVITY, URINE: 1.01
POC UROBILINOGEN, URINE: 0.2 EU/DL

## 2024-05-13 PROCEDURE — 99204 OFFICE O/P NEW MOD 45 MIN: CPT | Performed by: NURSE PRACTITIONER

## 2024-05-13 PROCEDURE — 1036F TOBACCO NON-USER: CPT | Performed by: NURSE PRACTITIONER

## 2024-05-13 PROCEDURE — 81003 URINALYSIS AUTO W/O SCOPE: CPT | Performed by: NURSE PRACTITIONER

## 2024-05-13 PROCEDURE — 1160F RVW MEDS BY RX/DR IN RCRD: CPT | Performed by: NURSE PRACTITIONER

## 2024-05-13 PROCEDURE — 1159F MED LIST DOCD IN RCRD: CPT | Performed by: NURSE PRACTITIONER

## 2024-05-13 PROCEDURE — 51798 US URINE CAPACITY MEASURE: CPT | Performed by: NURSE PRACTITIONER

## 2024-05-13 PROCEDURE — 1123F ACP DISCUSS/DSCN MKR DOCD: CPT | Performed by: NURSE PRACTITIONER

## 2024-05-13 RX ORDER — ESTRADIOL 0.1 MG/G
CREAM VAGINAL
Qty: 42.5 G | Refills: 5 | Status: SHIPPED | OUTPATIENT
Start: 2024-05-13 | End: 2025-05-13

## 2024-05-13 NOTE — PROGRESS NOTES
"05/13/24   [id number]    Microscopic hematuria, OAB, prolapse     Subjective      HPI Tawnya Odell is a 78 y.o. female who presents for microscopic hematuria, OAB, prolapse; saw JAHAIRA Sloan on 4/5/24, urine sent noted microscopic as trace heme, noted hematuria 3-5 wbc/hpf pyuria 11-20 wbc/hpf, neg culture;     Urine neg today, PVR 7 ml today    Prolapse for a while, not every day but 1-2x per month, urgency and has to have underwear extra in case, no pad;     DTF 6-7 x, NTF 1-2 x, some UUI, feels pressure for past 7-8 yrs; last night slept straight thru, but if awakened does have to get up; no UTIs for many years;    Feels irritation and prolapse, not sexually active now would like to be,  lives w her, he returned after leaving in 2019; they are not intimate; at least 20-24 yrs since intimacy;     Never worked chemicals or dyes  No urological cancer or cordova syndrome  Father colon cancer  Smoked 5-6 yrs in college  No gross hematuria,    PMH anxiety, HLD, prolapse  PSH breast reduction  FH father colon cancer, no female or urological cancer  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, , teacher, retired       Objective     /81   Pulse 72   Ht 1.676 m (5' 6\")   Wt 57.6 kg (127 lb)   BMI 20.50 kg/m²    Physical Exam  Genitourinary:     Comments: No vulvar lesions, neg Qtip tenderness, mod atrophy  Neg CST lying down, Neg levator ani tenderness or tightness  Stage II large cystocele, min rectocele or uterine prolapse  Non tender ovaries/uterus, difficult exam d/t  body habitus   No rectal exam done        General: Appears comfortable and in no apparent distress, well nourished  Head: Normocephalic, atraumatic  Neck: trachea midline  Respiratory: respirations unlabored, no wheezes, and no use of accessory muscles  Cardiovascular: at rest no dyspnea, well perfused  Skin: no visible rashes or lesions  Neurologic: grossly intact, oriented to person, place, and time  Psychiatric: mood " and affect appropriate  Musculoskeletal: in chair for appt. no difficulty w upper body movement      Assessment/Plan   Problem List Items Addressed This Visit    None  Visit Diagnoses       Bladder prolapse, female, acquired        Relevant Orders    POCT UA Automated manually resulted (Completed)    Post-Void Residual    Microhematuria        Relevant Orders    POCT UA Automated manually resulted (Completed)    Post-Void Residual    Other difficulties with micturition        Relevant Orders    Post-Void Residual          Orders Placed This Encounter   Procedures    Post-Void Residual    POCT UA Automated manually resulted     Order Specific Question:   Release result to [facility]     Answer:   Immediate [1]      Discussed options for prolapse, prefer female provider    Will make pessary fitting appt. But needs cystoscopy for microscopic hematuria work up as well and prefer female for cysto;     [place], will see if Dr. Farmer can perhaps do cysto and discuss surgical options    Estrogen cream start now and then come in for pessary fitting    Call [phone] to schedule CT Urogram;, creatinine blood work order in system to do one week prior (walk in at  lab)    Nurse line [phone]       Estefania Pacheco, APRN-CNP  Lab Results   Component Value Date    GLUCOSE 93 03/08/2024    CALCIUM 9.6 03/08/2024     03/08/2024    K 4.3 03/08/2024    CO2 32 03/08/2024     03/08/2024    BUN 17 03/08/2024    CREATININE 0.70 03/08/2024

## 2024-05-13 NOTE — Clinical Note
Rimma Farmer,  Patient w large stage II cystocele [place] provider[place] to see you. Also needs cysto for microscopic hematuria, could  you assist w scheduling her for Cysto w you? I ordered the CT Urogram.  Wasn't sure best way to proceed.   Thanks, Estefania

## 2024-05-13 NOTE — PATIENT INSTRUCTIONS
Discussed options for prolapse, prefer female provider  Will make pessary fitting appt. But needs cystoscopy for microscopic hematuria work up  From [place], [place] to go downtown, will see if Dr. Farmer can do cysto and discuss surgical options  Estrogen cream start now and then come in for pessary fitting  Call [phone] to schedule CT Urogram;, creatinine blood work order in system to do one week prior (walk in at  lab)    Nurse line [phone]

## 2024-05-17 ENCOUNTER — LAB (OUTPATIENT)
Dept: LAB | Facility: LAB | Age: 79
End: 2024-05-17
Payer: MEDICARE

## 2024-05-17 ENCOUNTER — HOSPITAL ENCOUNTER (OUTPATIENT)
Dept: RADIOLOGY | Facility: CLINIC | Age: 79
Discharge: HOME | End: 2024-05-17
Payer: MEDICARE

## 2024-05-17 ENCOUNTER — APPOINTMENT (OUTPATIENT)
Dept: GASTROENTEROLOGY | Facility: EXTERNAL LOCATION | Age: 79
End: 2024-05-17
Payer: MEDICARE

## 2024-05-17 DIAGNOSIS — N39.41 URGENCY INCONTINENCE: ICD-10-CM

## 2024-05-17 DIAGNOSIS — R31.29 MICROHEMATURIA: ICD-10-CM

## 2024-05-17 LAB
CREAT SERPL-MCNC: 0.75 MG/DL (ref 0.5–1.05)
EGFRCR SERPLBLD CKD-EPI 2021: 83 ML/MIN/1.73M*2

## 2024-05-17 PROCEDURE — 36415 COLL VENOUS BLD VENIPUNCTURE: CPT

## 2024-05-17 PROCEDURE — 87086 URINE CULTURE/COLONY COUNT: CPT

## 2024-05-17 PROCEDURE — 82565 ASSAY OF CREATININE: CPT

## 2024-05-18 LAB — BACTERIA UR CULT: NO GROWTH

## 2024-05-20 ENCOUNTER — HOSPITAL ENCOUNTER (OUTPATIENT)
Dept: RADIOLOGY | Facility: CLINIC | Age: 79
Discharge: HOME | End: 2024-05-20
Payer: MEDICARE

## 2024-05-20 PROCEDURE — 2550000001 HC RX 255 CONTRASTS: Performed by: NURSE PRACTITIONER

## 2024-05-20 PROCEDURE — 76377 3D RENDER W/INTRP POSTPROCES: CPT

## 2024-05-20 RX ADMIN — IOHEXOL 69 ML: 350 INJECTION, SOLUTION INTRAVENOUS at 09:06

## 2024-08-01 ENCOUNTER — OFFICE VISIT (OUTPATIENT)
Dept: OBSTETRICS AND GYNECOLOGY | Facility: CLINIC | Age: 79
End: 2024-08-01
Payer: MEDICARE

## 2024-08-01 VITALS
HEART RATE: 98 BPM | HEIGHT: 66 IN | WEIGHT: 132.2 LBS | BODY MASS INDEX: 21.24 KG/M2 | DIASTOLIC BLOOD PRESSURE: 63 MMHG | SYSTOLIC BLOOD PRESSURE: 105 MMHG

## 2024-08-01 DIAGNOSIS — N39.9 URINARY DISORDER: Primary | ICD-10-CM

## 2024-08-01 DIAGNOSIS — N32.81 OVERACTIVE BLADDER: ICD-10-CM

## 2024-08-01 LAB
POC APPEARANCE, URINE: CLEAR
POC BILIRUBIN, URINE: NEGATIVE
POC BLOOD, URINE: NEGATIVE
POC COLOR, URINE: YELLOW
POC GLUCOSE, URINE: NEGATIVE MG/DL
POC KETONES, URINE: NEGATIVE MG/DL
POC LEUKOCYTES, URINE: NEGATIVE
POC NITRITE,URINE: NEGATIVE
POC PH, URINE: 6 PH
POC PROTEIN, URINE: NEGATIVE MG/DL
POC SPECIFIC GRAVITY, URINE: 1.01
POC UROBILINOGEN, URINE: 0.2 EU/DL

## 2024-08-01 PROCEDURE — 99204 OFFICE O/P NEW MOD 45 MIN: CPT | Performed by: OBSTETRICS & GYNECOLOGY

## 2024-08-01 PROCEDURE — 51798 US URINE CAPACITY MEASURE: CPT | Performed by: OBSTETRICS & GYNECOLOGY

## 2024-08-01 PROCEDURE — 81003 URINALYSIS AUTO W/O SCOPE: CPT | Performed by: OBSTETRICS & GYNECOLOGY

## 2024-08-01 PROCEDURE — 1036F TOBACCO NON-USER: CPT | Performed by: OBSTETRICS & GYNECOLOGY

## 2024-08-01 PROCEDURE — 1159F MED LIST DOCD IN RCRD: CPT | Performed by: OBSTETRICS & GYNECOLOGY

## 2024-08-01 PROCEDURE — 1123F ACP DISCUSS/DSCN MKR DOCD: CPT | Performed by: OBSTETRICS & GYNECOLOGY

## 2024-08-01 PROCEDURE — 99214 OFFICE O/P EST MOD 30 MIN: CPT | Performed by: OBSTETRICS & GYNECOLOGY

## 2024-08-01 PROCEDURE — 1126F AMNT PAIN NOTED NONE PRSNT: CPT | Performed by: OBSTETRICS & GYNECOLOGY

## 2024-08-01 RX ORDER — TROSPIUM CHLORIDE 20 MG/1
TABLET, FILM COATED ORAL
Qty: 30 TABLET | Refills: 3 | Status: SHIPPED | OUTPATIENT
Start: 2024-08-01

## 2024-08-01 ASSESSMENT — PAIN SCALES - GENERAL: PAINLEVEL: 0-NO PAIN

## 2024-08-01 ASSESSMENT — ENCOUNTER SYMPTOMS
ROS GI COMMENTS: HEMORRHOIDS
UNEXPECTED WEIGHT CHANGE: 1
DIZZINESS: 1

## 2024-08-01 NOTE — LETTER
August 1, 2024     Jeanna Suazo DO  [address] [facility], [address]  [facility] [address]    Patient: Tawnya Odell   YOB: 1945   Date of Visit: 8/1/2024       Dear Dr. Jeanna Suazo DO:    Thank you for referring Tawnya Odell to me for evaluation. Below are my notes for this consultation.  If you have questions, please do not hesitate to call me. I look forward to following your patient along with you.       Sincerely,     Sofia Farmer MD      CC: Estefania Pacheco, JAHAIRA-CNP  ______________________________________________________________________________________    Urogynecology  Provider:  Sofia Farmer MD  [phone]              ASSESSMENT AND PLAN:   79 y.o. female being assessed for stage 2 cystocele and OAB. Co morbidities: anxiety, HLD, diverticulosis.                Diagnoses:   #1 Stage 2 cystocele   #2 OAB     Plan:   1. Stage 2 cystocele    - PVR = 0 ml.   - Reviewed risk factors, natural history and etiology of prolapse. Counseled that since her PVR is normal, she may consider expectant management if she is not particularly bothered by her POP. We specifically provided reassurance that prolapse is a quality of life issue and that POP intervention is only necessary when she is bothered by this or if it is causing retention issues. If it was bothersome, she could place a pessary or do surgery consisting of an anterior repair. Aging, genetics, and having children can make POP worse.   - She will continue with observation.     2. OAB   - We discussed OAB lifestyle changes (i.e., limiting fluids to 8-10 oz per hour and avoiding bladder irritants).  - The second line treatment would be medications.   - Sent rx for Trospium 20 mg; 1/2 to 1 tab twice a day prn for urge incontinence. Side effects include dry mouth or constipation. She can switch to a daily medication if she likes this rx.      3. Hx of trace intact blood in urine  - Urine is negative today.   -  4/5/24 trace intact blood, culture was negative  - CT urogram: 1. No CT urogram finding to explain patient's microscopic hematuria. To note, the urinary bladder was under filled which limits the evaluation with apparent wall thickening could be related to chronic cystitis but no gross filling defects or urothelial enhancing lesions 2. Uncomplicated colonic diverticulosis.  - Will continue to monitor and defer a cystoscopy at this time.   - Looking back at urinalysis for last 2-3 years no blood noted in urine. Will observe and hold off on cysto for now.     Follow-up in 6 months with Dr. Farmer for a prolapse and urine check.     Scribe Attestation:   I, Lyssa Woods, am scribing for virtually, and in the presence of Sofia Farmer MD on 8/1/24 at 4:39 PM.     Agree with above. I Dr. Farmer, personally performed the services described in the documentation which was scribed virtually and confirm it is both complete and accurate.  Sofia Farmer MD      Problem List Items Addressed This Visit    None          I spent a total of 45 minutes in face to face and non face to face time.      Sofia Farmer MD              HISTORY OF PRESENT ILLNESS:     Tawnya Odell is a 79 y.o. female who presents for prolapse and hematuria. PCP is Dr. Suazo. Sent in consultation by [facility].    Record Review:   - 4/5/24 trace intact blood, culture was negative  - CT urogram: 1. No CT urogram finding to explain patient's microscopic hematuria. To note, the urinary bladder was under filled which limits the evaluation with apparent wall thickening could be related to chronic cystitis but no gross filling defects or urothelial enhancing lesions 2. Uncomplicated colonic diverticulosis.    Prolapse Symptoms:   - Endorses POP.   - It is not bothersome, but she knows its there.      Urinary Symptoms:   - Leaks urine with urgency and carries extra underwear with her.   - She feels like Kegels help strengthen her  PF.   - Drinks cup of tea and cup of coffee daily.     Bowel Symptoms:   - Takes mag citrate for constipation, which keeps her bowels regular.     Sexual Activity:   - Not sexually active x25 years with her .     OBGYN Hx:   - Denies hx of abnormal pap.   -       Past Medical History:     - anxiety, HLD, diverticulosis     Past Medical History:   Diagnosis Date   • Personal history of other specified conditions     History of snoring   • Unspecified cataract     Cataracts, bilateral          Past Surgical History:     -  breast reduction, appendectomy     Past Surgical History:   Procedure Laterality Date   • APPENDECTOMY  2015    Appendectomy   • DILATION AND CURETTAGE OF UTERUS  2015    Dilation And Curettage   • MOUTH SURGERY  2018    Oral Surgery Tooth Extraction   • OTHER SURGICAL HISTORY  2015    bilat breast reduction   • TONSILLECTOMY  2018    Tonsillectomy         Medications:       Prior to Admission medications    Medication Sig Start Date End Date Taking? Authorizing Provider   estradiol (Estrace) 0.01 % (0.1 mg/gram) vaginal cream Apply nightly pea size amount w finger to vaginal opening for 2 weeks, then 3 times per week. 24  JAHAIRA Samuels-CNP   olopatadine (Patanol) 0.1 % ophthalmic solution Administer 1 drop into both eyes 2 times a day.  Patient not taking: Reported on 2024  DO KENNEDI Mckay  Review of Systems   Constitutional:  Positive for unexpected weight change.   HENT:  Positive for tinnitus.    Eyes:  Positive for visual disturbance.   Gastrointestinal:         Hemorrhoids   Neurological:  Positive for dizziness.          PHYSICAL EXAM:      There were no vitals taken for this visit.     No LMP recorded.      Declines chaperone for physical exam.    PVR= 0 ml.     Well developed, well nourished, in no apparent distress.   Neurologic/Psychiatric:  Awake, Alert and Oriented times 3.  Affect normal.  Normal cranial nerves  Pulm: breathing without effort  Sexual maturity: Elkin stage V  Abd exam: soft, non-tender      GENITAL/URINARY:       External Genitalia:  The patient has normal appearing external genitalia, normal skenes and bartholins glands, and a normal hair distribution.  Her vulva is without lesions, erythema or discharge.  It is non-tender with appropriate sensation.     Urethral Meatus:  Size normal, Location normal, Lesions absent, Prolapse absent,      Urethra:  Fullness absent, Masses absent,      Bladder:  Fullness absent, Masses absent, Tenderness absent,      Vagina:  General appearance normal, Discharge absent, Lesions absent,      Cervix: Normal, no discharge.   Uterus:   small, mobile  Adnexa:   bilateral normal adnexa, no significant adnexal masses    Anus/Perineum:  Lesions absent and Masses absent defer exam  Normal Perineum    POP-Q  The patient has Stage 2 Prolapse.    POP-Q:  Stage: 2  Position: standing straining     Aa: 0       Ba:  C: -7   Gh:  Pb:  TVL: 10         Ap: -3 Bp:  D: -8               Sofia Farmer MD

## 2024-08-01 NOTE — PROGRESS NOTES
Urogynecology  Provider:  Sofia Farmer MD  [phone]              ASSESSMENT AND PLAN:   79 y.o. female being assessed for stage 2 cystocele and OAB. Co morbidities: anxiety, HLD, diverticulosis.                Diagnoses:   #1 Stage 2 cystocele   #2 OAB     Plan:   1. Stage 2 cystocele    - PVR = 0 ml.   - Reviewed risk factors, natural history and etiology of prolapse. Counseled that since her PVR is normal, she may consider expectant management if she is not particularly bothered by her POP. We specifically provided reassurance that prolapse is a quality of life issue and that POP intervention is only necessary when she is bothered by this or if it is causing retention issues. If it was bothersome, she could place a pessary or do surgery consisting of an anterior repair. Aging, genetics, and having children can make POP worse.   - She will continue with observation.     2. OAB   - We discussed OAB lifestyle changes (i.e., limiting fluids to 8-10 oz per hour and avoiding bladder irritants).  - The second line treatment would be medications.   - Sent rx for Trospium 20 mg; 1/2 to 1 tab twice a day prn for urge incontinence. Side effects include dry mouth or constipation. She can switch to a daily medication if she likes this rx.      3. Hx of trace intact blood in urine  - Urine is negative today.   - 4/5/24 trace intact blood, culture was negative  - CT urogram: 1. No CT urogram finding to explain patient's microscopic hematuria. To note, the urinary bladder was under filled which limits the evaluation with apparent wall thickening could be related to chronic cystitis but no gross filling defects or urothelial enhancing lesions 2. Uncomplicated colonic diverticulosis.  - Will continue to monitor and defer a cystoscopy at this time.   - Looking back at urinalysis for last 2-3 years no blood noted in urine. Will observe and hold off on cysto for now.     Follow-up in 6 months with Dr. Farmer for a prolapse  and urine check.     Scribe Attestation:   I, Lyssa Woods, am scribing for virtually, and in the presence of Sofia Farmer MD on 24 at 4:39 PM.     Agree with above. I Dr. Farmer, personally performed the services described in the documentation which was scribed virtually and confirm it is both complete and accurate.  Sofia Farmer MD      Problem List Items Addressed This Visit    None          I spent a total of 45 minutes in face to face and non face to face time.      Sofia Farmer MD              HISTORY OF PRESENT ILLNESS:     Tawnya Odell is a 79 y.o. female who presents for prolapse and hematuria. PCP is Dr. Suazo. Sent in consultation by [facility].    Record Review:   - 24 trace intact blood, culture was negative  - CT urogram: 1. No CT urogram finding to explain patient's microscopic hematuria. To note, the urinary bladder was under filled which limits the evaluation with apparent wall thickening could be related to chronic cystitis but no gross filling defects or urothelial enhancing lesions 2. Uncomplicated colonic diverticulosis.    Prolapse Symptoms:   - Endorses POP.   - It is not bothersome, but she knows its there.      Urinary Symptoms:   - Leaks urine with urgency and carries extra underwear with her.   - She feels like Kegels help strengthen her PF.   - Drinks cup of tea and cup of coffee daily.     Bowel Symptoms:   - Takes mag citrate for constipation, which keeps her bowels regular.     Sexual Activity:   - Not sexually active x25 years with her .     OBGYN Hx:   - Denies hx of abnormal pap.   -       Past Medical History:     - anxiety, HLD, diverticulosis     Past Medical History:   Diagnosis Date    Personal history of other specified conditions     History of snoring    Unspecified cataract     Cataracts, bilateral          Past Surgical History:     -  breast reduction, appendectomy     Past Surgical History:   Procedure  Laterality Date    APPENDECTOMY  01/19/2015    Appendectomy    DILATION AND CURETTAGE OF UTERUS  01/19/2015    Dilation And Curettage    MOUTH SURGERY  01/17/2018    Oral Surgery Tooth Extraction    OTHER SURGICAL HISTORY  01/19/2015    bilat breast reduction    TONSILLECTOMY  01/17/2018    Tonsillectomy         Medications:       Prior to Admission medications    Medication Sig Start Date End Date Taking? Authorizing Provider   estradiol (Estrace) 0.01 % (0.1 mg/gram) vaginal cream Apply nightly pea size amount w finger to vaginal opening for 2 weeks, then 3 times per week. 5/13/24 5/13/25  Estefania Pacheco, APRN-CNP   olopatadine (Patanol) 0.1 % ophthalmic solution Administer 1 drop into both eyes 2 times a day.  Patient not taking: Reported on 5/13/2024 7/31/23 7/30/24  DO KENNEDI Mckay  Review of Systems   Constitutional:  Positive for unexpected weight change.   HENT:  Positive for tinnitus.    Eyes:  Positive for visual disturbance.   Gastrointestinal:         Hemorrhoids   Neurological:  Positive for dizziness.          PHYSICAL EXAM:      There were no vitals taken for this visit.     No LMP recorded.      Declines chaperone for physical exam.    PVR= 0 ml.     Well developed, well nourished, in no apparent distress.   Neurologic/Psychiatric:  Awake, Alert and Oriented times 3.  Affect normal. Normal cranial nerves  Pulm: breathing without effort  Sexual maturity: Elkin stage V  Abd exam: soft, non-tender      GENITAL/URINARY:       External Genitalia:  The patient has normal appearing external genitalia, normal skenes and bartholins glands, and a normal hair distribution.  Her vulva is without lesions, erythema or discharge.  It is non-tender with appropriate sensation.     Urethral Meatus:  Size normal, Location normal, Lesions absent, Prolapse absent,      Urethra:  Fullness absent, Masses absent,      Bladder:  Fullness absent, Masses absent, Tenderness absent,      Vagina:  General appearance  normal, Discharge absent, Lesions absent,      Cervix: Normal, no discharge.   Uterus:   small, mobile  Adnexa:   bilateral normal adnexa, no significant adnexal masses    Anus/Perineum:  Lesions absent and Masses absent defer exam  Normal Perineum    POP-Q  The patient has Stage 2 Prolapse.    POP-Q:  Stage: 2  Position: standing straining     Aa: 0       Ba:  C: -7   Gh:  Pb:  TVL: 10         Ap: -3 Bp:  D: -8               Sofia Farmer MD

## 2024-09-19 ENCOUNTER — APPOINTMENT (OUTPATIENT)
Dept: OBSTETRICS AND GYNECOLOGY | Facility: CLINIC | Age: 79
End: 2024-09-19
Payer: MEDICARE

## 2024-10-05 ENCOUNTER — HOSPITAL ENCOUNTER (EMERGENCY)
Facility: HOSPITAL | Age: 79
Discharge: HOME | End: 2024-10-05
Attending: EMERGENCY MEDICINE
Payer: MEDICARE

## 2024-10-05 ENCOUNTER — APPOINTMENT (OUTPATIENT)
Dept: RADIOLOGY | Facility: HOSPITAL | Age: 79
End: 2024-10-05
Payer: MEDICARE

## 2024-10-05 ENCOUNTER — APPOINTMENT (OUTPATIENT)
Dept: CARDIOLOGY | Facility: HOSPITAL | Age: 79
End: 2024-10-05
Payer: MEDICARE

## 2024-10-05 VITALS
SYSTOLIC BLOOD PRESSURE: 120 MMHG | HEART RATE: 74 BPM | HEIGHT: 67 IN | OXYGEN SATURATION: 95 % | BODY MASS INDEX: 20.4 KG/M2 | WEIGHT: 130 LBS | DIASTOLIC BLOOD PRESSURE: 72 MMHG | RESPIRATION RATE: 17 BRPM | TEMPERATURE: 98.6 F

## 2024-10-05 DIAGNOSIS — D73.89 SPLENIC LESION: ICD-10-CM

## 2024-10-05 DIAGNOSIS — K57.90 DIVERTICULOSIS: ICD-10-CM

## 2024-10-05 DIAGNOSIS — R55 SYNCOPE, UNSPECIFIED SYNCOPE TYPE: Primary | ICD-10-CM

## 2024-10-05 DIAGNOSIS — E27.9 ADRENAL NODULE: ICD-10-CM

## 2024-10-05 LAB
ALBUMIN SERPL BCP-MCNC: 3.9 G/DL (ref 3.4–5)
ALP SERPL-CCNC: 73 U/L (ref 33–136)
ALT SERPL W P-5'-P-CCNC: 18 U/L (ref 7–45)
ANION GAP SERPL CALC-SCNC: 13 MMOL/L (ref 10–20)
APPEARANCE UR: CLEAR
AST SERPL W P-5'-P-CCNC: 17 U/L (ref 9–39)
BASOPHILS # BLD AUTO: 0.04 X10*3/UL (ref 0–0.1)
BASOPHILS NFR BLD AUTO: 0.6 %
BILIRUB SERPL-MCNC: 0.3 MG/DL (ref 0–1.2)
BILIRUB UR STRIP.AUTO-MCNC: NEGATIVE MG/DL
BUN SERPL-MCNC: 22 MG/DL (ref 6–23)
CALCIUM SERPL-MCNC: 8.3 MG/DL (ref 8.6–10.3)
CARDIAC TROPONIN I PNL SERPL HS: 4 NG/L (ref 0–13)
CARDIAC TROPONIN I PNL SERPL HS: 5 NG/L (ref 0–13)
CHLORIDE SERPL-SCNC: 103 MMOL/L (ref 98–107)
CO2 SERPL-SCNC: 27 MMOL/L (ref 21–32)
COLOR UR: ABNORMAL
CREAT SERPL-MCNC: 0.78 MG/DL (ref 0.5–1.05)
EGFRCR SERPLBLD CKD-EPI 2021: 78 ML/MIN/1.73M*2
EOSINOPHIL # BLD AUTO: 0.11 X10*3/UL (ref 0–0.4)
EOSINOPHIL NFR BLD AUTO: 1.6 %
ERYTHROCYTE [DISTWIDTH] IN BLOOD BY AUTOMATED COUNT: 11.9 % (ref 11.5–14.5)
GLUCOSE SERPL-MCNC: 124 MG/DL (ref 74–99)
GLUCOSE UR STRIP.AUTO-MCNC: NORMAL MG/DL
HCT VFR BLD AUTO: 37.7 % (ref 36–46)
HGB BLD-MCNC: 13 G/DL (ref 12–16)
HOLD SPECIMEN: NORMAL
IMM GRANULOCYTES # BLD AUTO: 0.03 X10*3/UL (ref 0–0.5)
IMM GRANULOCYTES NFR BLD AUTO: 0.4 % (ref 0–0.9)
KETONES UR STRIP.AUTO-MCNC: NEGATIVE MG/DL
LEUKOCYTE ESTERASE UR QL STRIP.AUTO: NEGATIVE
LYMPHOCYTES # BLD AUTO: 3.13 X10*3/UL (ref 0.8–3)
LYMPHOCYTES NFR BLD AUTO: 45 %
MAGNESIUM SERPL-MCNC: 2.1 MG/DL (ref 1.6–2.4)
MCH RBC QN AUTO: 33.3 PG (ref 26–34)
MCHC RBC AUTO-ENTMCNC: 34.5 G/DL (ref 32–36)
MCV RBC AUTO: 97 FL (ref 80–100)
MONOCYTES # BLD AUTO: 0.66 X10*3/UL (ref 0.05–0.8)
MONOCYTES NFR BLD AUTO: 9.5 %
NEUTROPHILS # BLD AUTO: 2.98 X10*3/UL (ref 1.6–5.5)
NEUTROPHILS NFR BLD AUTO: 42.9 %
NITRITE UR QL STRIP.AUTO: NEGATIVE
NRBC BLD-RTO: 0 /100 WBCS (ref 0–0)
PH UR STRIP.AUTO: 7.5 [PH]
PLATELET # BLD AUTO: 205 X10*3/UL (ref 150–450)
POTASSIUM SERPL-SCNC: 3.5 MMOL/L (ref 3.5–5.3)
PROT SERPL-MCNC: 6 G/DL (ref 6.4–8.2)
PROT UR STRIP.AUTO-MCNC: NEGATIVE MG/DL
RBC # BLD AUTO: 3.9 X10*6/UL (ref 4–5.2)
RBC # UR STRIP.AUTO: NEGATIVE /UL
SODIUM SERPL-SCNC: 139 MMOL/L (ref 136–145)
SP GR UR STRIP.AUTO: 1.05
UROBILINOGEN UR STRIP.AUTO-MCNC: NORMAL MG/DL
WBC # BLD AUTO: 7 X10*3/UL (ref 4.4–11.3)

## 2024-10-05 PROCEDURE — 74177 CT ABD & PELVIS W/CONTRAST: CPT

## 2024-10-05 PROCEDURE — 36415 COLL VENOUS BLD VENIPUNCTURE: CPT | Performed by: EMERGENCY MEDICINE

## 2024-10-05 PROCEDURE — 85025 COMPLETE CBC W/AUTO DIFF WBC: CPT

## 2024-10-05 PROCEDURE — 93005 ELECTROCARDIOGRAM TRACING: CPT

## 2024-10-05 PROCEDURE — 83735 ASSAY OF MAGNESIUM: CPT

## 2024-10-05 PROCEDURE — 70450 CT HEAD/BRAIN W/O DYE: CPT

## 2024-10-05 PROCEDURE — 76377 3D RENDER W/INTRP POSTPROCES: CPT

## 2024-10-05 PROCEDURE — 74177 CT ABD & PELVIS W/CONTRAST: CPT | Performed by: STUDENT IN AN ORGANIZED HEALTH CARE EDUCATION/TRAINING PROGRAM

## 2024-10-05 PROCEDURE — 36415 COLL VENOUS BLD VENIPUNCTURE: CPT

## 2024-10-05 PROCEDURE — 76377 3D RENDER W/INTRP POSTPROCES: CPT | Performed by: STUDENT IN AN ORGANIZED HEALTH CARE EDUCATION/TRAINING PROGRAM

## 2024-10-05 PROCEDURE — 84484 ASSAY OF TROPONIN QUANT: CPT | Performed by: EMERGENCY MEDICINE

## 2024-10-05 PROCEDURE — 70486 CT MAXILLOFACIAL W/O DYE: CPT | Performed by: STUDENT IN AN ORGANIZED HEALTH CARE EDUCATION/TRAINING PROGRAM

## 2024-10-05 PROCEDURE — 84075 ASSAY ALKALINE PHOSPHATASE: CPT

## 2024-10-05 PROCEDURE — 2550000001 HC RX 255 CONTRASTS: Performed by: EMERGENCY MEDICINE

## 2024-10-05 PROCEDURE — 81003 URINALYSIS AUTO W/O SCOPE: CPT

## 2024-10-05 PROCEDURE — 70450 CT HEAD/BRAIN W/O DYE: CPT | Performed by: STUDENT IN AN ORGANIZED HEALTH CARE EDUCATION/TRAINING PROGRAM

## 2024-10-05 PROCEDURE — 99285 EMERGENCY DEPT VISIT HI MDM: CPT

## 2024-10-05 PROCEDURE — 70486 CT MAXILLOFACIAL W/O DYE: CPT

## 2024-10-05 PROCEDURE — 84484 ASSAY OF TROPONIN QUANT: CPT

## 2024-10-05 RX ADMIN — IOHEXOL 75 ML: 350 INJECTION, SOLUTION INTRAVENOUS at 03:54

## 2024-10-05 ASSESSMENT — PAIN SCALES - GENERAL
PAINLEVEL_OUTOF10: 0 - NO PAIN
PAINLEVEL_OUTOF10: 2
PAINLEVEL_OUTOF10: 0 - NO PAIN
PAINLEVEL_OUTOF10: 0 - NO PAIN

## 2024-10-05 ASSESSMENT — COLUMBIA-SUICIDE SEVERITY RATING SCALE - C-SSRS
6. HAVE YOU EVER DONE ANYTHING, STARTED TO DO ANYTHING, OR PREPARED TO DO ANYTHING TO END YOUR LIFE?: NO
1. IN THE PAST MONTH, HAVE YOU WISHED YOU WERE DEAD OR WISHED YOU COULD GO TO SLEEP AND NOT WAKE UP?: NO
2. HAVE YOU ACTUALLY HAD ANY THOUGHTS OF KILLING YOURSELF?: NO

## 2024-10-05 ASSESSMENT — PAIN - FUNCTIONAL ASSESSMENT: PAIN_FUNCTIONAL_ASSESSMENT: 0-10

## 2024-10-05 NOTE — ED TRIAGE NOTES
"PT BIBA from home with complaint of syncope. Pt was kneeling at bedside praying before bed when she felt dizzy and lightheaded and feel on carpet floor, hit her nose (was bleeding for EMS on arrival). Pt denies blood thinners. Pt only takes vitamins at home. Pt states she has had episodes of this in the past but was \"a long time ago\" Pt states this was a vasovagal episode in the past. No findings noted on last episode. Pt states she is in overall good health otherwise.   Pt noted to have abdominal cramping prior to the syncopal episode. Pt denies blood in stool.   VSS.   "

## 2024-10-05 NOTE — ED PROVIDER NOTES
HPI   Chief Complaint   Patient presents with    Syncope       79-year-old female otherwise healthy presents the ED today with a chief concern of syncope.  Patient reports that she had a syncopal episode just prior to arrival.  She reports that she was Pramine suddenly felt some lower abdominal pain and discomfort and feeling like she needed to have a bowel movement.  She reports that she had 1 episode of diarrhea.  She reports that she was praying and felt lightheaded and dizzy and fell on her face.  She reports that prior to that she was calling for her son.  Her son came over into her room and saw her on the ground.  He reports that he called 911 immediately.  When he came back over to patient she was awake and alert.  Never had similar symptoms in the past.  Patient reports that right now she just has lower abdominal cramping.  No fevers.  Reports nausea but no vomiting.  No further episodes of diarrhea.  Does not feel lightheaded or dizzy right now.  No headache.  Reports that she did have a little bit of a bloody nose at first however that has subsided.  No other symptoms or concern at this time.      History provided by:  Patient (Family)   used: No            Patient History   Past Medical History:   Diagnosis Date    Personal history of other specified conditions     History of snoring    Unspecified cataract     Cataracts, bilateral     Past Surgical History:   Procedure Laterality Date    APPENDECTOMY  01/19/2015    Appendectomy    DILATION AND CURETTAGE OF UTERUS  01/19/2015    Dilation And Curettage    MOUTH SURGERY  01/17/2018    Oral Surgery Tooth Extraction    OTHER SURGICAL HISTORY  01/19/2015    bilat breast reduction    TONSILLECTOMY  01/17/2018    Tonsillectomy     Family History   Problem Relation Name Age of Onset    Colon cancer Father       Social History     Tobacco Use    Smoking status: Never    Smokeless tobacco: Never   Substance Use Topics    Alcohol use: Never     Drug use: Never       Physical Exam   ED Triage Vitals [10/05/24 0135]   Temp Heart Rate Respirations BP   -- 59 18 121/72      Pulse Ox Temp src Heart Rate Source Patient Position   97 % -- -- --      BP Location FiO2 (%)     -- --       Physical Exam  General: The pain the patient is sitting comfortably no acute distress.  Vital signs per nursing note.  Skin: No rashes, lesions, scars.  Normal skin turgor.  HEENT: The head is atraumatic normocephalic.  The neck is supple.  The trachea is midline.  No tenderness to palpation of the head.  Eyelashes and eyebrows are of normal quantity, distribution, color, and position bilaterally without lesions.  No enophthalmos or exophthalmos.  PERRLA, EOMI without nystagmus.  Negative raccoon eyes. External ear anatomy is normal.  TMs are white/gray and translucent.  Light reflex and bony landmarks are present.  No erythema, bulging, or retraction of the TM.  Negative kennedy sign.  Hearing is grossly intact.  Dried blood in bilateral nares.  Lips and buccal mucosa are pink and moist without lesions.  Tongue is midline without lesions.  Uvula is midline with symmetric elevation of the soft palate.  Normal phonation.  No hoarseness.  No muffled voice.  No nasal deformity.  Lungs: Lungs are clear to auscultation bilaterally.  No rhonchi, wheezing, or rales.  No stridor.  Symmetric chest expansion  Heart: Normal S1-S2 no murmurs, rubs, gallops.  Abdomen: Abdomen is flat, nontender, nondistended.  No rebound tenderness or guarding.  No pulsatile mass  Peripheral vascular: Symmetric 2+ radial and dorsalis pedis pulses.   Neurologic: Alert and oriented x4.  Thought process is coherent.  5/5 strength in the upper and lower extremity.  Sensation is intact in the upper and lower extremity.  Normal gait.  Rapid alternating motor movements are intact.  Musculoskeletal: No overlying skin changes throughout the entire back.  No C, T, L spine tenderness. Full ROM of the neck and back.   :  Deferred    ED Course & [facility]   ED Course as of 10/05/24 2214   Sat Oct 05, 2024   0237 Ventricular rate 64 bpm.  OR interval 170 ms.  QRS duration 76 ms.  QT/QTc 418/431 in the left patient is in normal sinus rhythm at a ventricular rate of 64 bpm.  Normal axis.  There is good R wave progression.  No right or left bundle-branch block.  No ST elevations.  No previous EKG to compare this to. []   2210 CBC shows no evidence of leukocytosis or acute anemia.  CMP shows no TERESITA or acute liver injury.  Glucose 124.  Urinalysis shows no UTI.  Initial troponin 5.  Delta troponin 4.  Magnesium normal. [MC]   2211 IMPRESSION:  No acute intracranial abnormality.      No acute facial fractures detected. Minimal soft tissue swelling over  the nose without evidence of acute nasal bone fracture.      Additional chronic findings as above.      MACRO:  None      Signed by: Sanket Anne 10/5/2024 4:29 AM  Dictation workstation:   [id number]           []   2212 IMPRESSION:  No acute findings in the abdomen or pelvis.      Extensive colonic diverticulosis. No CT features of acute  diverticulitis.      Additional chronic findings as described above.      MACRO:  None      Signed by: Sanket Anne 10/5/2024 4:21 AM  Dictation workstation:   [id number]   []      ED Course User Index  [MC] Pa Mir PA-C         Diagnoses as of 10/05/24 2214   Syncope, unspecified syncope type   Splenic lesion   Adrenal nodule   Diverticulosis                 No data recorded                                 Medical Decision Making  79-year-old female otherwise healthy presents the ED today with a chief concern of syncope.  Vital signs reassuring.  Patient overall appears well and is nontoxic-appearing. Patient is fully neurologically intact with no acute neurological deficits.  Her labs are overall reassuring.  Her CT head and facial bones are unremarkable.  Her CT abdomen pelvis shows no acute findings.  Low suspicion for any cardiac etiology  at this time.  I offered and recommended hospitalization however patient is refusing at this time.  Reports that she would like to go home.  She does have a primary care and has close follow-up with them.  She has no signs of other trauma or injury on my exam.   low suspicion for any aortic dissection, tamponade, PE, or GI bleed at this time.  Not concern for any SAH at this time.  Multiple incidental findings noted on CT scan.  Patient was informed of these.  EKG shows no ischemic changes.  Troponins stable.  Discussed impression and findings with patient she feels comfortable returning home.  We discussed very strict return precautions including returning for any new or worsening signs or symptoms.  Patient is in agreement with this plan.  She will follow-up with her PCP within 3 days.  Again discussed strict return precautions.  Patient was also seen and evaluated by attending physician.  Chest with patient that if she has another syncopal episode she should return to the ED immediately.    Differential diagnosis: Vasovagal, cardiogenic, PE, ACS, pneumothorax, EKG abnormality, AAA    Disposition/treatment  1.  See above    Shared decision-making was used patient feels comfortable returning home     Patient was advised to follow up with recommended provider in 1 day1 for another evaluation and exam. I advised patient/guardian to return or go to closest emergency room immediately if symptoms change, get worse, new symptoms develop prior to follow up. If there is no improvement in symptoms in the next 24 hours they are advised to return for further evaluation and exam. I also explained the plan and treatment course. Patient/guardian is in agreement with plan, treatment course, and follow up and states verbally that they will comply.    Homegoing. I discussed the differential; results and discharge plan with the patient and/or family/friend/caregiver if present.  I emphasized the importance of follow-up with the  physician I referred them to in the timeframe recommended.  I explained reasons for the patient to return to the Emergency Department. They agreed that if they feel their condition is worsening or if they have any other concern they should call 911 immediately for further assistance. I gave the patient an opportunity to ask all questions they had and answered all of them accordingly. They understand return precautions and discharge instructions. The patient and/or family/friend/caregiver expressed understanding verbally and that they would comply.        This note has been transcribed using voice recognition and may contain grammatical errors, misplaced words, incorrect words, incorrect phrases or other errors.        Procedure  Procedures     Pa Mir PA-C  10/05/24 1207

## 2024-10-07 LAB
ATRIAL RATE: 64 BPM
P AXIS: 31 DEGREES
P OFFSET: 189 MS
P ONSET: 141 MS
PR INTERVAL: 170 MS
Q ONSET: 226 MS
QRS COUNT: 11 BEATS
QRS DURATION: 76 MS
QT INTERVAL: 418 MS
QTC CALCULATION(BAZETT): 431 MS
QTC FREDERICIA: 427 MS
R AXIS: 46 DEGREES
T AXIS: 62 DEGREES
T OFFSET: 435 MS
VENTRICULAR RATE: 64 BPM

## 2024-12-30 ENCOUNTER — TELEPHONE (OUTPATIENT)
Dept: PRIMARY CARE | Facility: CLINIC | Age: 79
End: 2024-12-30
Payer: MEDICARE

## 2025-01-24 ENCOUNTER — TELEPHONE (OUTPATIENT)
Dept: PRIMARY CARE | Facility: CLINIC | Age: 80
End: 2025-01-24
Payer: MEDICARE

## 2025-01-24 NOTE — TELEPHONE ENCOUNTER
CALLED TO SCHEDULE [facility] LAST ONE WAS 2.27.24 LEFT MESSAGE TO CALL OFFICE THAT MD IS SCHEDULING IN TO MAR 25

## 2025-02-01 NOTE — PROGRESS NOTES
Urogynecology  Provider:  Sofia Farmer MD  [phone]    ASSESSMENT AND PLAN:   79 year old female with OAB, AMH, and a stage 3 cystocele.     Diagnoses:  #1 Overactive bladder  #2 Asymptomatic microscopic hematuria  #3 Cystocele, stage 3  #4 Vaginal atrophy    Plan:  1. OAB  - Declines interest in starting an OAB medication and never started the Trospium 20mg BID which was previously prescribed.     2. AMH  - POCT UA negative today.   - 4/5/2024 trace intact blood on UA and culture was negative  - Reviewed previous CT urogram from 5/20/2024 that demonstrated no renal stones, hydronephrosis, or kidney masses to explain her AMH. To note, the urinary bladder was under filled which limits the evaluation with apparent wall thickening could be related to chronic cystitis but no gross filling defects or urothelial enhancing lesions   - Will continue to monitor AMH and defers a cystoscopy at this time.     3. Cystocele, stage 2  - Upon Pop-Q standing straining exam the Aa: +1 in comparison to previous exams where the anterior wall came to the opening at 0.   - Reviewed risk factors, natural history and etiology of prolapse. Counseled that since her PVR is normal, she may consider expectant management if she is not particularly bothered by her POP.   - We specifically provided reassurance that prolapse is a quality of life issue and that POP intervention is only necessary when she is bothered by this or if it is causing retention issues. If it was bothersome, she could place a pessary or do surgery consisting of an anterior repair. However, the goal of surgery would be to improve her quality of life and degree of bother related to her vaginal bulge symptoms.   - Plan to continue POP surveillance exam every 4-6 months and will consider being fit with a pessary to reduce her POP.     4. Vaginal atrophy  - We encouarged her to use tv Estrace cream 2x/week instead of 1x per week and she may consider using this in the  mornings to avoid forgetting to use E2 cream.   - Reviewed that E2 cream can improve OAB symptoms with decreasing urinary urgency and frequency by up to 30% and is used for UTI ppx in postmenopausal women.     Follow up in 4 months with Dr. Farmer for a POP check.     Scribe Attestation  By signing my name below, I, Demetri Parker, [place], attest that this documentation has been prepared under the direction and in the presence of Sofia Farmer MD on 02/03/2025 at 6:57 PM.     Agree with above. I Dr. Farmer, personally performed the services described in the documentation which was scribed virtually and confirm it is both complete and accurate.  Sofia Farmer MD        Problem List Items Addressed This Visit    None  Visit Diagnoses       Asymptomatic microscopic hematuria    -  Primary    Relevant Orders    POCT UA Automated manually resulted (Completed)               I spent a total of eConsult Time: 25 minutes in face to face and non face to face time.        Sofia Farmer MD        HISTORY OF PRESENT ILLNESS:   79 year old female presenting in follow up for OAB and stage 2 cystocele.     Records Review:   - Last visit 8/2024  Diagnoses:   #1 Stage 2 cystocele   #2 OAB      Plan:   1. Stage 2 cystocele    - PVR = 0 ml.   - Reviewed risk factors, natural history and etiology of prolapse. Counseled that since her PVR is normal, she may consider expectant management if she is not particularly bothered by her POP. We specifically provided reassurance that prolapse is a quality of life issue and that POP intervention is only necessary when she is bothered by this or if it is causing retention issues. If it was bothersome, she could place a pessary or do surgery consisting of an anterior repair. Aging, genetics, and having children can make POP worse.   - She will continue with observation.      2. OAB   - We discussed OAB lifestyle changes (i.e., limiting fluids to 8-10 oz per hour and avoiding  bladder irritants).  - The second line treatment would be medications.   - Was sent rx for Trospium 20 mg; 1/2 to 1 tab twice a day prn for urge incontinence. Side effects include dry mouth or constipation.   Has not tried Trospium for side effects. Continues to have once weekly incontinence.     3. Hx of trace intact blood in urine  - Urine is negative today.   - 4/5/24 trace intact blood, culture was negative  - CT urogram: 1. No CT urogram finding to explain patient's microscopic hematuria. To note, the urinary bladder was under filled which limits the evaluation with apparent wall thickening could be related to chronic cystitis but no gross filling defects or urothelial enhancing lesions 2. Uncomplicated colonic diverticulosis.  - Will continue to monitor and defer a cystoscopy at this time.   - Looking back at urinalysis for last 2-3 years no blood noted in urine. Will observe and hold off on cysto for now.      Urinary Symptoms:   - No gross hematuria.   - For OAB she was previously prescribed Trospium but did not take it due to concerns about side effects.   - The patient is currently using Estrace cream 1x/week although it was recommended to use it 2x/week.     Prolapse Symptoms:  - Her POP is not particularly bothersome other than when she tries to insert E2 cream.   - Patient is not currently sexually active.       Past Medical History:    Past Medical History:   Diagnosis Date    Personal history of other specified conditions     History of snoring    Unspecified cataract     Cataracts, bilateral          Past Surgical History:     Past Surgical History:   Procedure Laterality Date    APPENDECTOMY  01/19/2015    Appendectomy    DILATION AND CURETTAGE OF UTERUS  01/19/2015    Dilation And Curettage    MOUTH SURGERY  01/17/2018    Oral Surgery Tooth Extraction    OTHER SURGICAL HISTORY  01/19/2015    bilat breast reduction    TONSILLECTOMY  01/17/2018    Tonsillectomy         Medications:     Prior to  Admission medications    Medication Sig Start Date End Date Taking? Authorizing Provider   estradiol (Estrace) 0.01 % (0.1 mg/gram) vaginal cream Apply nightly pea size amount w finger to vaginal opening for 2 weeks, then 3 times per week. 5/13/24 5/13/25  Estefania Pacheco APRN-CNP   olopatadine (Patanol) 0.1 % ophthalmic solution Administer 1 drop into both eyes 2 times a day.  Patient not taking: Reported on 5/13/2024 7/31/23 7/30/24  Jeanna Suazo DO   trospium (Sanctura) 20 mg tablet 1/2 to 1 tab twice a day prn for urge incontinence 8/1/24   MD KENNEDI Garcia  Review of Systems   Constitutional: Negative.    HENT: Negative.     Eyes: Negative.    Respiratory: Negative.     Cardiovascular: Negative.    Gastrointestinal: Negative.    Endocrine: Negative.    Genitourinary:  Positive for frequency and urgency.   Musculoskeletal: Negative.    Neurological: Negative.    Psychiatric/Behavioral: Negative.          Blood, Urine   Date Value Ref Range Status   10/05/2024 NEGATIVE NEGATIVE Final     Poc Nitrite, Urine   Date Value Ref Range Status   08/01/2024 NEGATIVE NEGATIVE Final     Nitrite, Urine   Date Value Ref Range Status   10/05/2024 NEGATIVE NEGATIVE Final     Urobilinogen, Urine   Date Value Ref Range Status   10/05/2024 Normal Normal mg/dL Final         PHYSICAL EXAM:    There were no vitals taken for this visit.  No LMP recorded.      Declines chaperone for physical exam.      Well developed, well nourished, in no apparent distress.   Neurologic/Psychiatric:  Awake, Alert and Oriented times 3.  Affect normal.     GENITAL/URINARY:     External Genitalia:  The patient has normal appearing external genitalia, normal skenes and bartholins glands, and a normal hair distribution.  Her vulva is without lesions, erythema or discharge.  It is non-tender with appropriate sensation.     Urethral Meatus:  Size normal, Location normal, Lesions absent, Prolapse absent.     Urethra:  Fullness absent, Masses  absent.     Bladder:  Fullness absent, Masses absent, Tenderness absent.    Vagina:  General appearance normal, Discharge absent, Lesions absent.  Moderately atrophic. No significant foreign body.     Cervix: Normal, no discharge.   Uterus:  normal size, mobile, and nontender  Adnexa: normal, no tenderness or masses over the bilateral adnexa      Anus/Perineum:  Lesions absent and masses absent.     Stress urinary incontinence not demonstrable.         POP-Q:  Stage: 3  Position: standing    Aa: +1       Ba: +1 C: -7   Gh:  Pb:  TVL: 10         Ap: -3 Bp: -3 D: -8               Data and DIAGNOSTIC STUDIES REVIEWED   No results found.   Lab Results   Component Value Date    URINECULTURE No growth 05/17/2024      Lab Results   Component Value Date    GLUCOSE 124 (H) 10/05/2024    CALCIUM 8.3 (L) 10/05/2024     10/05/2024    K 3.5 10/05/2024    CO2 27 10/05/2024     10/05/2024    BUN 22 10/05/2024    CREATININE 0.78 10/05/2024     Lab Results   Component Value Date    WBC 7.0 10/05/2024    HGB 13.0 10/05/2024    HCT 37.7 10/05/2024    MCV 97 10/05/2024     10/05/2024          Sofia Farmer MD

## 2025-02-03 ENCOUNTER — OFFICE VISIT (OUTPATIENT)
Dept: OBSTETRICS AND GYNECOLOGY | Facility: CLINIC | Age: 80
End: 2025-02-03
Payer: MEDICARE

## 2025-02-03 VITALS
BODY MASS INDEX: 20.84 KG/M2 | SYSTOLIC BLOOD PRESSURE: 127 MMHG | DIASTOLIC BLOOD PRESSURE: 75 MMHG | WEIGHT: 132.8 LBS | HEART RATE: 84 BPM

## 2025-02-03 DIAGNOSIS — R31.21 ASYMPTOMATIC MICROSCOPIC HEMATURIA: Primary | ICD-10-CM

## 2025-02-03 PROCEDURE — 99213 OFFICE O/P EST LOW 20 MIN: CPT | Performed by: OBSTETRICS & GYNECOLOGY

## 2025-02-03 PROCEDURE — 1123F ACP DISCUSS/DSCN MKR DOCD: CPT | Performed by: OBSTETRICS & GYNECOLOGY

## 2025-02-03 PROCEDURE — 81003 URINALYSIS AUTO W/O SCOPE: CPT | Performed by: OBSTETRICS & GYNECOLOGY

## 2025-02-03 PROCEDURE — 1159F MED LIST DOCD IN RCRD: CPT | Performed by: OBSTETRICS & GYNECOLOGY

## 2025-02-03 ASSESSMENT — ENCOUNTER SYMPTOMS
GASTROINTESTINAL NEGATIVE: 1
EYES NEGATIVE: 1
ENDOCRINE NEGATIVE: 1
CARDIOVASCULAR NEGATIVE: 1
FREQUENCY: 1
RESPIRATORY NEGATIVE: 1
PSYCHIATRIC NEGATIVE: 1
MUSCULOSKELETAL NEGATIVE: 1
CONSTITUTIONAL NEGATIVE: 1
NEUROLOGICAL NEGATIVE: 1

## 2025-02-07 ENCOUNTER — TELEPHONE (OUTPATIENT)
Dept: PRIMARY CARE | Facility: CLINIC | Age: 80
End: 2025-02-07
Payer: MEDICARE

## 2025-02-07 NOTE — TELEPHONE ENCOUNTER
THIRD VOICEMAIL LEFT TODAY TO SCHEDULE [facility]W WITH PCP LAST MCW WAS 2.27.24 LEFT NUMBER AND OPTION FOR PATIENT TO CALL AND RETURN OUR CALL

## 2025-02-10 ENCOUNTER — TELEPHONE (OUTPATIENT)
Dept: PRIMARY CARE | Facility: CLINIC | Age: 80
End: 2025-02-10
Payer: MEDICARE

## 2025-02-10 NOTE — TELEPHONE ENCOUNTER
4TH VOICEMAIL LET ASKED PATIENT TO CALL OFFICE AND LET US KNOW EITHER WAY IF SHE WANTEDHER MCW OR IF HAS NEW PCP. LAST MCW 2.27.24

## 2025-03-31 ENCOUNTER — APPOINTMENT (OUTPATIENT)
Dept: PRIMARY CARE | Facility: CLINIC | Age: 80
End: 2025-03-31
Payer: MEDICARE

## 2025-03-31 VITALS
WEIGHT: 139.6 LBS | DIASTOLIC BLOOD PRESSURE: 80 MMHG | OXYGEN SATURATION: 94 % | HEIGHT: 66 IN | HEART RATE: 80 BPM | SYSTOLIC BLOOD PRESSURE: 120 MMHG | BODY MASS INDEX: 22.43 KG/M2

## 2025-03-31 DIAGNOSIS — R53.83 OTHER FATIGUE: ICD-10-CM

## 2025-03-31 DIAGNOSIS — Z78.0 MENOPAUSE: ICD-10-CM

## 2025-03-31 DIAGNOSIS — Z13.0 SCREENING FOR DISORDER OF BLOOD AND BLOOD-FORMING ORGANS: ICD-10-CM

## 2025-03-31 DIAGNOSIS — E55.9 VITAMIN D DEFICIENCY: ICD-10-CM

## 2025-03-31 DIAGNOSIS — Z12.11 ENCOUNTER FOR SCREENING FOR MALIGNANT NEOPLASM OF COLON: ICD-10-CM

## 2025-03-31 DIAGNOSIS — R55 SYNCOPE, UNSPECIFIED SYNCOPE TYPE: ICD-10-CM

## 2025-03-31 DIAGNOSIS — Z00.00 MEDICARE ANNUAL WELLNESS VISIT, SUBSEQUENT: Primary | ICD-10-CM

## 2025-03-31 DIAGNOSIS — F32.A MELANCHOLY: ICD-10-CM

## 2025-03-31 DIAGNOSIS — Z13.29 SCREENING FOR THYROID DISORDER: ICD-10-CM

## 2025-03-31 DIAGNOSIS — Z13.6 SCREENING FOR CARDIOVASCULAR CONDITION: ICD-10-CM

## 2025-03-31 PROBLEM — F41.9 ANXIETY DISORDER: Status: RESOLVED | Noted: 2023-04-18 | Resolved: 2025-03-31

## 2025-03-31 PROCEDURE — G0439 PPPS, SUBSEQ VISIT: HCPCS | Performed by: FAMILY MEDICINE

## 2025-03-31 PROCEDURE — 1160F RVW MEDS BY RX/DR IN RCRD: CPT | Performed by: FAMILY MEDICINE

## 2025-03-31 PROCEDURE — 1159F MED LIST DOCD IN RCRD: CPT | Performed by: FAMILY MEDICINE

## 2025-03-31 PROCEDURE — 1170F FXNL STATUS ASSESSED: CPT | Performed by: FAMILY MEDICINE

## 2025-03-31 PROCEDURE — 1036F TOBACCO NON-USER: CPT | Performed by: FAMILY MEDICINE

## 2025-03-31 PROCEDURE — 1123F ACP DISCUSS/DSCN MKR DOCD: CPT | Performed by: FAMILY MEDICINE

## 2025-03-31 ASSESSMENT — ACTIVITIES OF DAILY LIVING (ADL)
DRESSING: INDEPENDENT
MANAGING_FINANCES: INDEPENDENT
TAKING_MEDICATION: INDEPENDENT
GROCERY_SHOPPING: INDEPENDENT
BATHING: INDEPENDENT
DOING_HOUSEWORK: INDEPENDENT

## 2025-03-31 ASSESSMENT — ENCOUNTER SYMPTOMS
LOSS OF SENSATION IN FEET: 0
OCCASIONAL FEELINGS OF UNSTEADINESS: 0
DEPRESSION: 0

## 2025-03-31 ASSESSMENT — ANXIETY QUESTIONNAIRES
IF YOU CHECKED OFF ANY PROBLEMS ON THIS QUESTIONNAIRE, HOW DIFFICULT HAVE THESE PROBLEMS MADE IT FOR YOU TO DO YOUR WORK, TAKE CARE OF THINGS AT HOME, OR GET ALONG WITH OTHER PEOPLE: NOT DIFFICULT AT ALL
4. TROUBLE RELAXING: NOT AT ALL
2. NOT BEING ABLE TO STOP OR CONTROL WORRYING: NOT AT ALL
5. BEING SO RESTLESS THAT IT IS HARD TO SIT STILL: NOT AT ALL
3. WORRYING TOO MUCH ABOUT DIFFERENT THINGS: NOT AT ALL
6. BECOMING EASILY ANNOYED OR IRRITABLE: NOT AT ALL
7. FEELING AFRAID AS IF SOMETHING AWFUL MIGHT HAPPEN: NOT AT ALL
GAD7 TOTAL SCORE: 0
1. FEELING NERVOUS, ANXIOUS, OR ON EDGE: NOT AT ALL

## 2025-03-31 ASSESSMENT — LIFESTYLE VARIABLES
SKIP TO QUESTIONS 9-10: 1
AUDIT-C TOTAL SCORE: 0
HOW OFTEN DO YOU HAVE A DRINK CONTAINING ALCOHOL: NEVER
HOW OFTEN DO YOU HAVE SIX OR MORE DRINKS ON ONE OCCASION: NEVER
HOW MANY STANDARD DRINKS CONTAINING ALCOHOL DO YOU HAVE ON A TYPICAL DAY: PATIENT DOES NOT DRINK

## 2025-03-31 ASSESSMENT — PATIENT HEALTH QUESTIONNAIRE - PHQ9
2. FEELING DOWN, DEPRESSED OR HOPELESS: SEVERAL DAYS
10. IF YOU CHECKED OFF ANY PROBLEMS, HOW DIFFICULT HAVE THESE PROBLEMS MADE IT FOR YOU TO DO YOUR WORK, TAKE CARE OF THINGS AT HOME, OR GET ALONG WITH OTHER PEOPLE: SOMEWHAT DIFFICULT
SUM OF ALL RESPONSES TO PHQ9 QUESTIONS 1 AND 2: 2
1. LITTLE INTEREST OR PLEASURE IN DOING THINGS: SEVERAL DAYS

## 2025-03-31 NOTE — PROGRESS NOTES
"Subjective   Reason for Visit: Tawnya Odell is an 79 y.o. female here for a Medicare Wellness visit.     Past Medical, Surgical, and Family History reviewed and updated in chart.    Reviewed all medications by prescribing practitioner or clinical pharmacist (such as prescriptions, OTCs, herbal therapies and supplements) and documented in the medical record.    HPI    Increased stressors with family stressors.    History of syncope in the past with dehydration or vomiting.  No recent episodes.    SOC Hx:   Tobacco Use: Low Risk  (3/31/2025)    Patient History     Smoking Tobacco Use: Never     Smokeless Tobacco Use: Never     Passive Exposure: Not on file     Alcohol Use: Not At Risk (3/31/2025)    AUDIT-C     Frequency of Alcohol Consumption: Never     Average Number of Drinks: Patient does not drink     Frequency of Binge Drinking: Never     DIET: general  Supplements: magnesium, Tumeric, Vit C     EXERCISE:  walking 2-3 times per week at least a mile    ELIMINATION: constipation - taking mag citrate  COLON CA SCREENING: COLONOSCOPY 2/27/2018 REPEAT DUE 5 years - Dr. Dinh    URINARY SYSTEM:  OAB followed by Dr. Farmer  Started on estrogen cream twice weekly to help prevent UTI's  Known prolapsed bladder    SLEEP:   intermittent issues     MOODS: increased stressors with recent loss of one of her dearest friends and another battling cancer  Feeling stressed.      ELLEN-7 Total Score: 0     OB/GYN: followed by Dr. Farmer  LMP: No LMP recorded. Patient is postmenopausal.  Menstrual cycles - menopausal  DEXA: ordered    Patient Care Team:  Jeanna Suazo DO as PCP - General (Family Medicine)  Jeanna Suazo DO as PCP [place] [facility] [place] [place]     Review of Systems    Objective   Vitals:  /80 (BP Location: Left arm, Patient Position: Sitting, BP Cuff Size: Adult)   Pulse 80   Ht 1.676 m (5' 6\")   Wt 63.3 kg (139 lb 9.6 oz)   SpO2 94%   BMI 22.53 kg/m²       Physical Exam  Constitutional:       " General: She is not in acute distress.     Appearance: Normal appearance.   HENT:      Head: Normocephalic and atraumatic.      Right Ear: Tympanic membrane, ear canal and external ear normal.      Left Ear: Tympanic membrane, ear canal and external ear normal.      Nose: Nose normal.      Mouth/Throat:      Mouth: Mucous membranes are moist.      Pharynx: No oropharyngeal exudate or posterior oropharyngeal erythema.   Eyes:      Extraocular Movements: Extraocular movements intact.      Conjunctiva/sclera: Conjunctivae normal.      Pupils: Pupils are equal, round, and reactive to light.   Cardiovascular:      Rate and Rhythm: Normal rate and regular rhythm.      Heart sounds: No murmur heard.  Pulmonary:      Effort: Pulmonary effort is normal.      Breath sounds: Normal breath sounds.   Abdominal:      General: Bowel sounds are normal.      Palpations: Abdomen is soft.   Musculoskeletal:         General: Normal range of motion.      Cervical back: No rigidity.   Lymphadenopathy:      Cervical: No cervical adenopathy.   Skin:     General: Skin is warm and dry.      Findings: No rash.   Neurological:      General: No focal deficit present.      Mental Status: She is alert and oriented to person, place, and time.      Cranial Nerves: No cranial nerve deficit.      Gait: Gait normal.   Psychiatric:         Mood and Affect: Mood normal.         Behavior: Behavior normal.       Assessment & Plan  Medicare annual wellness visit, subsequent    Orders:    1 Year Follow Up In [facility] - PCP - Wellness Exam; Future    Menopause    Orders:    XR DEXA bone density; Future    Encounter for screening for malignant neoplasm of colon    Orders:    Colonoscopy Screening; Average Risk Patient; Future    Vitamin D deficiency    Orders:    Vitamin D 25-Hydroxy,Total (for eval of Vitamin D levels); Future    Screening for disorder of blood and blood-forming organs    Orders:    CBC; Future    Other fatigue    Orders:     CBC; Future    Screening for cardiovascular condition    Orders:    Lipid Panel; Future    Screening for thyroid disorder    Orders:    TSH with reflex to Free T4 if abnormal; Future    Syncope, unspecified syncope type  Checking electrolytes - encouraging adequate fluid intake.  Orders:    Comprehensive Metabolic Panel; Future    Melancholy  I have discussed the collaborative care model for this patient's behavioral health care.  Written detailed information and identifying the members of this care team was provided to patient.  They give permission for the Behavioral Health Manager (BHM) and psychiatric consultant to be included in their care with my continued primary management.  Patient made aware that services provided as part of the Collaborative Care Model are subject to insurance billing.    Orders:    Follow Up In [facility] - Behavioral Health Collaborative Care CoCM; Future           Depression Screening  5 - 10 minutes were spent screening for depression.     Medicare Wellness Billing Compliance Satisfied    *This is a visual tool to show completion of required items on the day of the visit. Green checks will only appear on the date of visit.    Review all medications by prescribing practitioner or clinical pharmacist (such as prescriptions, OTCs, herbal therapies and supplements) documented in the medical record    Past Medical, Surgical, and Family History reviewed and updated in chart    Tobacco Use Reviewed    Alcohol Use Reviewed    Illicit Drug Use Reviewed    PHQ2/9    Falls in Last Year Reviewed    Home Safety Risk Factors Reviewed    Cognitive Impairment Reviewed    Patient Self Assessment and Health Status    Current Diet Reviewed    Exercise Frequency    ADL - Hearing Impairment    ADL - Bathing    ADL - Dressing    ADL - Walks in Home    IADL - Managing Finances    IADL - Grocery Shopping    IADL - Taking Medications    IADL - Doing Housework      Follow up  pending recommendations of Behavioral Health Collaborative Care Team and review of above.

## 2025-04-14 ENCOUNTER — APPOINTMENT (OUTPATIENT)
Dept: PRIMARY CARE | Facility: CLINIC | Age: 80
End: 2025-04-14
Payer: MEDICARE

## 2025-04-14 DIAGNOSIS — F32.A MELANCHOLY: ICD-10-CM

## 2025-04-14 ASSESSMENT — PATIENT HEALTH QUESTIONNAIRE - PHQ9
7. TROUBLE CONCENTRATING ON THINGS, SUCH AS READING THE NEWSPAPER OR WATCHING TELEVISION: NOT AT ALL
2. FEELING DOWN, DEPRESSED OR HOPELESS: SEVERAL DAYS
10. IF YOU CHECKED OFF ANY PROBLEMS, HOW DIFFICULT HAVE THESE PROBLEMS MADE IT FOR YOU TO DO YOUR WORK, TAKE CARE OF THINGS AT HOME, OR GET ALONG WITH OTHER PEOPLE: SOMEWHAT DIFFICULT
9. THOUGHTS THAT YOU WOULD BE BETTER OFF DEAD, OR OF HURTING YOURSELF: NOT AT ALL
1. LITTLE INTEREST OR PLEASURE IN DOING THINGS: SEVERAL DAYS
SUM OF ALL RESPONSES TO PHQ9 QUESTIONS 1 & 2: 2
5. POOR APPETITE OR OVEREATING: NOT AT ALL
8. MOVING OR SPEAKING SO SLOWLY THAT OTHER PEOPLE COULD HAVE NOTICED. OR THE OPPOSITE, BEING SO FIGETY OR RESTLESS THAT YOU HAVE BEEN MOVING AROUND A LOT MORE THAN USUAL: NOT AT ALL
6. FEELING BAD ABOUT YOURSELF - OR THAT YOU ARE A FAILURE OR HAVE LET YOURSELF OR YOUR FAMILY DOWN: NOT AT ALL
3. TROUBLE FALLING OR STAYING ASLEEP: SEVERAL DAYS
4. FEELING TIRED OR HAVING LITTLE ENERGY: SEVERAL DAYS
SUM OF ALL RESPONSES TO PHQ QUESTIONS 1-9: 4

## 2025-04-14 ASSESSMENT — ANXIETY QUESTIONNAIRES
6. BECOMING EASILY ANNOYED OR IRRITABLE: NOT AT ALL
2. NOT BEING ABLE TO STOP OR CONTROL WORRYING: NOT AT ALL
GAD7 TOTAL SCORE: 0
7. FEELING AFRAID AS IF SOMETHING AWFUL MIGHT HAPPEN: NOT AT ALL
IF YOU CHECKED OFF ANY PROBLEMS ON THIS QUESTIONNAIRE, HOW DIFFICULT HAVE THESE PROBLEMS MADE IT FOR YOU TO DO YOUR WORK, TAKE CARE OF THINGS AT HOME, OR GET ALONG WITH OTHER PEOPLE: NOT DIFFICULT AT ALL
4. TROUBLE RELAXING: NOT AT ALL
3. WORRYING TOO MUCH ABOUT DIFFERENT THINGS: NOT AT ALL
1. FEELING NERVOUS, ANXIOUS, OR ON EDGE: NOT AT ALL
5. BEING SO RESTLESS THAT IT IS HARD TO SIT STILL: NOT AT ALL

## 2025-04-14 NOTE — PROGRESS NOTES
Collaborative Care ([facility]) Initial Assessment      Appointment Time  Start: 1:05 PM  End: 2:13 PM     Collaborative Care program information (including case discussion with psychiatry, involvement of [facility] and billing when applicable) was provided and discussed with the patient. Patient Indicated understanding and agreed to proceed.   Confirm: Yes    Patient Health Questionnaire-9 Score: 4 (4/14/2025  1:13 PM)  ELLEN-7 Total Score: 0 (4/14/2025  1:15 PM)      PHQ:  Over the past 2 weeks, how often have you been bothered by any of the following problems?  Little interest or pleasure in doing things: Several days  Feeling down, depressed, or hopeless: Several days  Trouble falling or staying asleep, or sleeping too much: Several days  Feeling tired or having little energy: Several days  Poor appetite or overeating: Not at all  Feeling bad about yourself - or that you are a failure or have let yourself or your family down: Not at all  Trouble concentrating on things, such as reading the newspaper or watching television: Not at all  Moving or speaking so slowly that other people could have noticed? Or the opposite - being so fidgety or restless that you have been moving around a lot more than usual.: Not at all  Thoughts that you would be better off dead or hurting yourself in some way: Not at all  Patient Health Questionnaire-9 Score: 4    ELLEN:  ELLEN-7  Feeling Nervous, Anxious, or on Edge: Not at all  Not Being Able to Stop or Control Worrying: Not at all  Worrying too Much About Different Things: Not at all  Trouble Relaxing: Not at all  Being so Restless That it is Hard to Sit Still: Not at all  Becoming Easily Annoyed or Irritable: Not at all  Feeling Afraid as if Something Awful Might Happen: Not at all  ELLEN-7 Total Score: 0  If you checked off any problems, how difficult have these problems made it for you to do your work, take care of things at home, or get along with other people?: Not difficult at all    Reason for  "Visit / Chief Complaint  Chief Complaint   Patient presents with    Initial Assessment     Referring Provider: Jeanna Suazo DO    Brief Summary: Tawnya Odell is presenting to appointment seeking evaluation from the [facility] program for concerns of increased stressors incluidng the loss of a friend and another friend battling cancer.  History provided by patient and accompanied to appointment by self.    Review of Symptoms  Mood   Age of onset/first noticed: off and on since 2nd marriage  Current Sx: little interest/pleasure doing things, feeling down, feeling depressed, feeling tired/little energy, and low motivation  Triggers: grief of first 's death    Anxiety   Age of onset/first noticed: noticed during menopause  Empathetic- feels things more deeply and can get anxious;   Current Sx: denied  Anxiety/panic attacks: denied  Triggers: fear of something catastrophic happening at any time; lack of control; kids going on vacations; generally able to talk self out of worry  Social Anxiety: No    Other Psychiatric Review Of Systems:  Manic Symptoms: denied  Obsessive/Compulsive Symptoms: reports some tendencies of wanting items organized  Attention Deficit/Hyperactivity Symptoms: denied    Learning Concerns & Sx: denied  Memory Concerns & Sx:  none/denied  Hallucinations & Delusions Experienced: denied  Psychosocial Stressors: marital.  Anger/Irritability sx: internal/quiet   Appetite Sx:    Concerns with appetite: No concerns  Sleep Sx:    Concerns with sleep: doing better- self described \"health nut\" and takes supplements- magnesium \"to calm nerves\"    Trauma/Abuse Hx:   Births of children    Grief / Loss / Adjustment   First  walked out and grieved the loss of the marriage  First  came back into life a few years ago when terminally ill; he passed away a few days before Rebecca 2024.  Still actively grieving loss    Risk History  Suicidal Thoughts/Attempts/Risk Assessment:  Suicidal " "Thoughts/Method/Intent/Plan/Preparations:  thoughts of \"life isn't fun, what am I here for, what is my purpose\", but no attempts, methods, plans or preparations  Number of Suicide Attempts: 0  Access to Firearms/Lethal Means:  no  Non-Suicidal Self-injurious behavior/risky behavior: None, denied    Last [place] Risk Score:   [place] Suicide Severity Rating Scale (Frequent Screener/Short Version)  1. Have you wished you were dead or wished you could go to sleep and not wake up?: No  2. Have you actually had any thoughts of killing yourself?: No  6. Have you ever done anything, started to do anything, or prepared to do anything to end your life?: No  Risk of Suicide: No Risk  Protective Factors:  doesn't believe in suicide    Homicidal Thoughts/Attempts:  Homicidal Thoughts/Method/Plan/Intent: None, denied and no hx    Comprehensive Behavioral Health History   Associated Medical Concerns   Potential Associated Factors:   Patient Active Problem List   Diagnosis    Other hyperlipidemia    Insomnia    Weight loss    Alopecia    Elevated LDL cholesterol level      Current Outpatient Medications   Medication Instructions    estradiol (Estrace) 0.01 % (0.1 mg/gram) vaginal cream Apply nightly pea size amount w finger to vaginal opening for 2 weeks, then 3 times per week.        Mental Health Medications  Current Mental Health Medications:   None    Past Mental Health Medications:   Citalopram 20mg - stopped taking 3-4 years ago- was prescribed when going through menopause and had a lot of anxiety.  Side effect of numbing/taking emotions away  Clonazepam- took for insomnia    Concerns / challenges / barriers with taking medications? No concerns    Open to medication recommendations from consulting psychiatrist? unsure    Do you ever forget to take your medication? N/A    Mental Health Treatment History  Current Mental Health Treatment: None  Previous Mental Health Treatment: couples counseling    Substance Use/Treamtent " History  Social History     Substance and Sexual Activity   Alcohol Use Never     Social History     Substance and Sexual Activity   Drug Use Never     Use of Drugs: Denied  Use of Alcohol: Denied   Use of Caffeine: drinks liquids with caffeine until 3p    Family History of Mental Health:  Mental Health / Conditions  Uncle  Son- clinical depression/bipolar dx/ADHD     Substance Use  Denied    Family History of Suicide  Denied    Social History  Housing   Living Situation: lives with  in a house  Safe Housing Conditions / Feels Safe in Home: yes    Sexuality/Gender:    No Concerns    Employment  Current Employment: Retired, for past 9 years was a  at [employer] for a few hours/week- [employer]    Income   Financial Stability/Ability to live within means: Yes    Education   Status / Level of Education: College and 1 year of grad school- was studying to be a     :   N/A    Legal History:  Legal Considerations: None, denied    Transportation:   Transportation Concerns: None, denied      Rastafari/ Spirituality:   Are you Yazdanism or Spiritual: Yes, finds comfort in prayer when anxious    Relationships:   S/O: 2nd marriage, feels was rebound; Worried about alzheimer's/memory concerns with ;   Children: 3 children; oldest daughter- lives in [place], talks online, 2 granddaughters; 2 sons- never , middle son- lives at home; youngest son lives in [place]  Parents/Guardian: grew up with mom and dad  Siblings: N/A  Friends: has support of friends    Self-Esteem/Self-Image/Self-Expression  Self Esteem (1-10 Scale, 10 being high): 8; in terms of being a giver, good citizen, empathetic, takes care of family    Functional impairment   Impacting ADL's: no impairment   Impacting IADL's: No impairment  Impacting Ability : No impairment    Coping Skills/Supports:   Coping:   unsure if coping well with grief- cries 4-5x/day; online grief forums that let her know others are going  through similar experience; strong perla- pray and talk to god and let go of fears  Supports: 1 friend and people online    Mental Status Evaluation:  Mental Status Exam  General Appearance: Well groomed, appropriate eye contact  Attitude/Behavior: Cooperative  Motor: No psychomotor agitation or retardation, no tremor or other abnormal movements  Speech: Normal rate, volume, prosody  Gait/Station: WFL - Within functional limits  Mood: Appropriate for circumstances  Affect: Euthymic, full-range  Thought Process: Linear, goal directed  Thought Associations: No loosening of associations  Thought Content: Normal  Perception: No perceptual abnormalities noted  Sensorium: Alert and oriented to person, place, time and situation  Insight: Intact  Judgement: Intact  Cognition: Cognitively intact to conversational testing with respect to attention, orientation, fund of knowledge, recent and remote memory, and language  Testing: N/A     Assessment Summary  / Plan  Goals:  Patient Goals for Collaborative Care: work through some of her grief/loss    Plan:   Psych consult - ongoing, set meeting frequency, bi-weekly, Gfqolyg-[id number]-[id number] interventions, provide psycho-education, provide appropriate tx referrals, and provide appropriate resources    Provisional Findings / Impressions  Primary: Depression unspecified    Follow Up / Next Appointment: Next appointment: 04/23/25

## 2025-04-15 ENCOUNTER — HOSPITAL ENCOUNTER (OUTPATIENT)
Dept: RADIOLOGY | Facility: CLINIC | Age: 80
Discharge: HOME | End: 2025-04-15
Payer: MEDICARE

## 2025-04-15 DIAGNOSIS — Z78.0 MENOPAUSE: ICD-10-CM

## 2025-04-15 PROCEDURE — 77080 DXA BONE DENSITY AXIAL: CPT | Performed by: RADIOLOGY

## 2025-04-15 PROCEDURE — 77080 DXA BONE DENSITY AXIAL: CPT

## 2025-04-20 ENCOUNTER — DOCUMENTATION (OUTPATIENT)
Dept: PRIMARY CARE | Facility: CLINIC | Age: 80
End: 2025-04-20
Payer: MEDICARE

## 2025-04-21 NOTE — PROGRESS NOTES
[facility] Psychiatry Consult Note     Tawnya Odell is a 79 y.o., referred to LT[facility], located within [facility] for symptoms of depression and anxiety. I have reviewed the patient with the behavioral health manager and reviewed the patient's electronic record.    Brief summary: Patient is a 79-year-old female who presented to [facility] care secondary to grief and loss.  Patient endorsed grieving the loss of a friend who was also  her first .  Patient also reported the loss of another friend who was battling cancer.  Patient stated her first  reached out to her to come to terms with his poor choices prior to his passing.  Patient has been  to her second  for approximately 50 years.  Patient has 3 adult children.  She endorses relationship stressors and not feeling happy for the past 25 years.  She reported that her  is a teacher and puts work before his family.  Patient's psychiatric symptoms include decreased mood, crying often, passive suicidality without intent or plan.  Patient reported that she does not believe in suicide and would not be able to go through with it.  Patient denied any difficulties with sleep or appetite. Support is decreased although she does participate in online grief forums.  Patient was able to identify the need for increased socialization and assistance working through grief.    Past psychotropic medications include Klonopin and Celexa.  Patient self discontinued Celexa secondary to side effects of emotional numbness.  She reported significant withdrawal syndrome.  Patient has undergone psychotherapy in the past     Recommendations: Uncomplicated bereavement, depression unspecified versus major depressive disorder recurrent mild.  Recommend starting Prozac 20 mg p.o. daily for depressive symptoms, bereavement.  Behavioral health manager to monitor symptomology and provide support.        Patient Health Questionnaire-9 Score: 4 (4/14/2025  1:13 PM)  ELLEN-7 Total Score: 0  (4/14/2025  1:15 PM)  Low PHQ-9 scores and ELLEN-7 scores may be indicative of subclinical symptoms not meeting full criteria for major mood disorders or anxiety disorder.  Likewise low scores can be indicative of major mood disorder in partial remission.  Lastly low scores may be seen with patients who are under reports symptomology.    The above treatment considerations and suggestions are based on consultations with the patient's care manager and a review of information available in the electronic medical record. I have not personally examined the patient. All recommendations should be implemented with consideration of the patient's relevant prior history and current clinical status. Please feel free to call me with any questions about the care of this patient.

## 2025-04-22 LAB
25(OH)D3+25(OH)D2 SERPL-MCNC: 41 NG/ML (ref 30–100)
ALBUMIN SERPL-MCNC: 4.3 G/DL (ref 3.6–5.1)
ALP SERPL-CCNC: 77 U/L (ref 37–153)
ALT SERPL-CCNC: 20 U/L (ref 6–29)
ANION GAP SERPL CALCULATED.4IONS-SCNC: 8 MMOL/L (CALC) (ref 7–17)
AST SERPL-CCNC: 20 U/L (ref 10–35)
BILIRUB SERPL-MCNC: 0.6 MG/DL (ref 0.2–1.2)
BUN SERPL-MCNC: 18 MG/DL (ref 7–25)
CALCIUM SERPL-MCNC: 9.3 MG/DL (ref 8.6–10.4)
CHLORIDE SERPL-SCNC: 105 MMOL/L (ref 98–110)
CHOLEST SERPL-MCNC: 227 MG/DL
CHOLEST/HDLC SERPL: 2.4 (CALC)
CO2 SERPL-SCNC: 28 MMOL/L (ref 20–32)
CREAT SERPL-MCNC: 0.77 MG/DL (ref 0.6–1)
EGFRCR SERPLBLD CKD-EPI 2021: 79 ML/MIN/1.73M2
ERYTHROCYTE [DISTWIDTH] IN BLOOD BY AUTOMATED COUNT: 11.8 % (ref 11–15)
GLUCOSE SERPL-MCNC: 94 MG/DL (ref 65–99)
HCT VFR BLD AUTO: 43 % (ref 35–45)
HDLC SERPL-MCNC: 95 MG/DL
HGB BLD-MCNC: 14.4 G/DL (ref 11.7–15.5)
LDLC SERPL CALC-MCNC: 113 MG/DL (CALC)
MCH RBC QN AUTO: 32.3 PG (ref 27–33)
MCHC RBC AUTO-ENTMCNC: 33.5 G/DL (ref 32–36)
MCV RBC AUTO: 96.4 FL (ref 80–100)
NONHDLC SERPL-MCNC: 132 MG/DL (CALC)
PLATELET # BLD AUTO: 225 THOUSAND/UL (ref 140–400)
PMV BLD REES-ECKER: 11.5 FL (ref 7.5–12.5)
POTASSIUM SERPL-SCNC: 4.4 MMOL/L (ref 3.5–5.3)
PROT SERPL-MCNC: 6.5 G/DL (ref 6.1–8.1)
RBC # BLD AUTO: 4.46 MILLION/UL (ref 3.8–5.1)
SODIUM SERPL-SCNC: 141 MMOL/L (ref 135–146)
TRIGL SERPL-MCNC: 88 MG/DL
TSH SERPL-ACNC: 1.44 MIU/L (ref 0.4–4.5)
WBC # BLD AUTO: 4.8 THOUSAND/UL (ref 3.8–10.8)

## 2025-04-23 ENCOUNTER — APPOINTMENT (OUTPATIENT)
Dept: PRIMARY CARE | Facility: CLINIC | Age: 80
End: 2025-04-23
Payer: MEDICARE

## 2025-04-23 ASSESSMENT — ANXIETY QUESTIONNAIRES
3. WORRYING TOO MUCH ABOUT DIFFERENT THINGS: SEVERAL DAYS
7. FEELING AFRAID AS IF SOMETHING AWFUL MIGHT HAPPEN: NOT AT ALL
GAD7 TOTAL SCORE: 1
6. BECOMING EASILY ANNOYED OR IRRITABLE: NOT AT ALL
5. BEING SO RESTLESS THAT IT IS HARD TO SIT STILL: NOT AT ALL
4. TROUBLE RELAXING: NOT AT ALL
2. NOT BEING ABLE TO STOP OR CONTROL WORRYING: NOT AT ALL
1. FEELING NERVOUS, ANXIOUS, OR ON EDGE: NOT AT ALL
IF YOU CHECKED OFF ANY PROBLEMS ON THIS QUESTIONNAIRE, HOW DIFFICULT HAVE THESE PROBLEMS MADE IT FOR YOU TO DO YOUR WORK, TAKE CARE OF THINGS AT HOME, OR GET ALONG WITH OTHER PEOPLE: SOMEWHAT DIFFICULT

## 2025-04-23 ASSESSMENT — PATIENT HEALTH QUESTIONNAIRE - PHQ9
9. THOUGHTS THAT YOU WOULD BE BETTER OFF DEAD, OR OF HURTING YOURSELF: NOT AT ALL
5. POOR APPETITE OR OVEREATING: NOT AT ALL
8. MOVING OR SPEAKING SO SLOWLY THAT OTHER PEOPLE COULD HAVE NOTICED. OR THE OPPOSITE, BEING SO FIGETY OR RESTLESS THAT YOU HAVE BEEN MOVING AROUND A LOT MORE THAN USUAL: NOT AT ALL
10. IF YOU CHECKED OFF ANY PROBLEMS, HOW DIFFICULT HAVE THESE PROBLEMS MADE IT FOR YOU TO DO YOUR WORK, TAKE CARE OF THINGS AT HOME, OR GET ALONG WITH OTHER PEOPLE: SOMEWHAT DIFFICULT
2. FEELING DOWN, DEPRESSED OR HOPELESS: NOT AT ALL
6. FEELING BAD ABOUT YOURSELF - OR THAT YOU ARE A FAILURE OR HAVE LET YOURSELF OR YOUR FAMILY DOWN: NOT AT ALL
SUM OF ALL RESPONSES TO PHQ9 QUESTIONS 1 & 2: 0
7. TROUBLE CONCENTRATING ON THINGS, SUCH AS READING THE NEWSPAPER OR WATCHING TELEVISION: NOT AT ALL
4. FEELING TIRED OR HAVING LITTLE ENERGY: SEVERAL DAYS
1. LITTLE INTEREST OR PLEASURE IN DOING THINGS: NOT AT ALL
3. TROUBLE FALLING OR STAYING ASLEEP: NOT AT ALL
SUM OF ALL RESPONSES TO PHQ QUESTIONS 1-9: 1

## 2025-04-23 NOTE — PROGRESS NOTES
Collaborative Care ([facility])  Progress Note    Type of Interaction: In Office    Start Time: 1:37 PM    End Time: 2:04 PM    Appointment: Scheduled    Reason for Visit:   Chief Complaint   Patient presents with    Follow-up       Interval History / Patient Symptoms:   Tawnya Odell is a 79 y.o. female currently enrolled in the [facility] program for symptom monitoring, brief therapy, and support. Patient presents today for follow up for Collaborative Care services.     Metrics:  Patient Health Questionnaire-9 Score: 1 (4/23/2025  1:39 PM)  ELLEN-7 Total Score: 1 (4/23/2025  1:39 PM)       Over the past 2 weeks, how often have you been bothered by any of the following problems?  Little interest or pleasure in doing things: Not at all  Feeling down, depressed, or hopeless: Not at all  Trouble falling or staying asleep, or sleeping too much: Not at all  Feeling tired or having little energy: Several days  Poor appetite or overeating: Not at all  Feeling bad about yourself - or that you are a failure or have let yourself or your family down: Not at all  Trouble concentrating on things, such as reading the newspaper or watching television: Not at all  Moving or speaking so slowly that other people could have noticed? Or the opposite - being so fidgety or restless that you have been moving around a lot more than usual.: Not at all  Thoughts that you would be better off dead or hurting yourself in some way: Not at all  Patient Health Questionnaire-9 Score: 1     ELLEN-7  Feeling Nervous, Anxious, or on Edge: Not at all  Not Being Able to Stop or Control Worrying: Not at all  Worrying too Much About Different Things: Several days  Trouble Relaxing: Not at all  Being so Restless That it is Hard to Sit Still: Not at all  Becoming Easily Annoyed or Irritable: Not at all  Feeling Afraid as if Something Awful Might Happen: Not at all  ELLEN-7 Total Score: 1  If you checked off any problems, how difficult have these problems made it for you to  do your work, take care of things at home, or get along with other people?: Somewhat difficult     Interventions Provided:   Review Progress/Goals    Progress Made:   Gaining Insight/Knowledge    Response to Intervention:  We discussed the following elements during appointment:   [facility] reviewed recommendations made within [facility] on 4/17/25.  [facility] shared with patient the recommendations made by the consulting psychiatrist.  Patient informed that [facility] is currently coordinating with Jeanna Suazo DO if patient is wanting to move forward with recommendations. This included outlining the recommendation to trial fluoxetine.  Patient would like to think about whether she wants to try medication and will let [facility] know.  [facility] will continue to coordinate with Jeanna Suazo DO and return contact to patient once recommendation is approved by PCP.    Patient voiced understanding, would like to wait before moving forward with medication recommendations and try other non-medication interventions first.       Patient was engaged, responsive, and interactive.     Plan:   Follow up in 1 month; patient will be out of town for the next month visiting her daughter in [place].  Will start non-med recommendations at next appointment, including grief processing.    Follow Up / Next Appointment: Next appointment: 05/28/25

## 2025-04-30 ENCOUNTER — DOCUMENTATION (OUTPATIENT)
Dept: PRIMARY CARE | Facility: CLINIC | Age: 80
End: 2025-04-30
Payer: MEDICARE

## 2025-04-30 DIAGNOSIS — F32.A DEPRESSION, UNSPECIFIED DEPRESSION TYPE: Primary | ICD-10-CM

## 2025-04-30 PROCEDURE — 99492 1ST PSYC COLLAB CARE MGMT: CPT | Performed by: FAMILY MEDICINE

## 2025-04-30 PROCEDURE — 99494 1ST/SBSQ PSYC COLLAB CARE: CPT | Performed by: FAMILY MEDICINE

## 2025-05-01 PROBLEM — F32.A DEPRESSION, UNSPECIFIED: Status: ACTIVE | Noted: 2025-04-14

## 2025-05-28 ENCOUNTER — APPOINTMENT (OUTPATIENT)
Dept: PRIMARY CARE | Facility: CLINIC | Age: 80
End: 2025-05-28
Payer: MEDICARE

## 2025-06-02 ENCOUNTER — TELEPHONE (OUTPATIENT)
Dept: PRIMARY CARE | Facility: CLINIC | Age: 80
End: 2025-06-02

## 2025-06-02 ENCOUNTER — APPOINTMENT (OUTPATIENT)
Dept: OBSTETRICS AND GYNECOLOGY | Facility: CLINIC | Age: 80
End: 2025-06-02
Payer: MEDICARE

## 2025-06-02 NOTE — PROGRESS NOTES
RUBIA attempted to contact patient to reschedule appointment and to follow up on mental health care, medication, symptom management, and general wellbeing.  Unable to reach the patient and left a message requesting return phone call.     Attempt 2 on 6/6/25 at 10:34 AM- SHERLYN

## 2025-06-18 ENCOUNTER — TELEPHONE (OUTPATIENT)
Dept: PRIMARY CARE | Facility: CLINIC | Age: 80
End: 2025-06-18
Payer: MEDICARE

## 2025-06-30 ENCOUNTER — DOCUMENTATION (OUTPATIENT)
Dept: PRIMARY CARE | Facility: CLINIC | Age: 80
End: 2025-06-30
Payer: MEDICARE

## 2025-06-30 DIAGNOSIS — F32.A DEPRESSION, UNSPECIFIED DEPRESSION TYPE: Primary | ICD-10-CM

## 2025-08-26 DIAGNOSIS — Z12.11 SPECIAL SCREENING FOR MALIGNANT NEOPLASMS, COLON: ICD-10-CM

## 2025-08-26 RX ORDER — POLYETHYLENE GLYCOL 3350, SODIUM SULFATE ANHYDROUS, SODIUM BICARBONATE, SODIUM CHLORIDE, POTASSIUM CHLORIDE 236; 22.74; 6.74; 5.86; 2.97 G/4L; G/4L; G/4L; G/4L; G/4L
POWDER, FOR SOLUTION ORAL
Qty: 4000 ML | Refills: 0 | Status: SHIPPED | OUTPATIENT
Start: 2025-08-26

## 2025-09-15 ENCOUNTER — APPOINTMENT (OUTPATIENT)
Dept: GASTROENTEROLOGY | Facility: EXTERNAL LOCATION | Age: 80
End: 2025-09-15
Payer: MEDICARE

## 2025-09-24 ENCOUNTER — APPOINTMENT (OUTPATIENT)
Dept: GASTROENTEROLOGY | Facility: EXTERNAL LOCATION | Age: 80
End: 2025-09-24
Payer: MEDICARE